# Patient Record
Sex: FEMALE | Race: BLACK OR AFRICAN AMERICAN | Employment: FULL TIME | URBAN - METROPOLITAN AREA
[De-identification: names, ages, dates, MRNs, and addresses within clinical notes are randomized per-mention and may not be internally consistent; named-entity substitution may affect disease eponyms.]

---

## 2020-11-11 ENCOUNTER — VIRTUAL VISIT (OUTPATIENT)
Dept: FAMILY MEDICINE CLINIC | Age: 25
End: 2020-11-11
Payer: MEDICAID

## 2020-11-11 DIAGNOSIS — Z13.29 SCREENING FOR ENDOCRINE, METABOLIC AND IMMUNITY DISORDER: ICD-10-CM

## 2020-11-11 DIAGNOSIS — R43.9 DISTURBANCE OF SMELL AND TASTE: Primary | ICD-10-CM

## 2020-11-11 DIAGNOSIS — Z13.0 SCREENING FOR ENDOCRINE, METABOLIC AND IMMUNITY DISORDER: ICD-10-CM

## 2020-11-11 DIAGNOSIS — Z13.228 SCREENING FOR ENDOCRINE, METABOLIC AND IMMUNITY DISORDER: ICD-10-CM

## 2020-11-11 PROCEDURE — 99212 OFFICE O/P EST SF 10 MIN: CPT | Performed by: NURSE PRACTITIONER

## 2020-11-11 NOTE — PROGRESS NOTES
Sedrick Linn is a 31 y.o. female who was seen by synchronous (real-time) audio-video technology on 11/11/2020 for Other (smell issues had Covid 7-4-2020/negative testing negative now)        Assessment & Plan:   Diagnoses and all orders for this visit:    1. Disturbance of smell and taste  -     REFERRAL TO ENT-OTOLARYNGOLOGY        I spent at least 10 minutes on this visit with this new patient. 712  Subjective:     Loss of taste/Smell    Patient previously had COVID in July but have tested negative. Patient states when having COVID she had a lost of taste and smell but in August her taste and smell came back. Then in September start having a garlic taste in her mouth and nose. She states due to the heavy garlic smell in her nose she now feels like her whole body smell like garlic. Patient is not sure if this is a residual from [place] or note but would like a further workup. I will refer patient to ENT and also order routine lab work. Denies numbness, tingling in the mouth. Denies SOB or problems with swallowing. Patient deny any chronic issues today or concern. Prior to Admission medications    Medication Sig Start Date End Date Taking? Authorizing Provider   methocarbamol (ROBAXIN) 500 mg tablet Take 2 Tabs by mouth three (3) times daily. 1/26/20   Sorin Jackson PA-C     There is no problem list on file for this patient. There are no active problems to display for this patient. Current Outpatient Medications   Medication Sig Dispense Refill    methocarbamol (ROBAXIN) 500 mg tablet Take 2 Tabs by mouth three (3) times daily. 24 Tab 0     No Known Allergies  Past Medical History:   Diagnosis Date    COVID-19      Past Surgical History:   Procedure Laterality Date    HX ORTHOPAEDIC       History reviewed. No pertinent family history.   Social History     Tobacco Use    Smoking status: Never Smoker    Smokeless tobacco: Never Used   Substance Use Topics    Alcohol use: Yes     Comment: occasionally       Review of Systems   Constitutional: Negative for chills, fever and malaise/fatigue. HENT: Negative for congestion and sore throat. Smell garlic all day and mouth taste like garlic constantly   Respiratory: Negative for cough, shortness of breath, wheezing and stridor. Cardiovascular: Negative for chest pain and palpitations. Musculoskeletal: Negative. Skin: Negative. Objective:     Patient-Reported Vitals 11/11/2020   Patient-Reported Weight 172lbs   Patient-Reported LMP 10-      General: alert, cooperative, no distress   Mental  status: normal mood, behavior, speech, dress, motor activity, and thought processes, able to follow commands   HENT: NCAT   Neck: no visualized mass   Resp: no respiratory distress   Neuro: no gross deficits   Skin: no discoloration or lesions of concern on visible areas   Psychiatric: normal affect, consistent with stated mood, no evidence of hallucinations     Additional exam findings: We discussed the expected course, resolution and complications of the diagnosis(es) in detail. Medication risks, benefits, costs, interactions, and alternatives were discussed as indicated. I advised her to contact the office if her condition worsens, changes or fails to improve as anticipated. She expressed understanding with the diagnosis(es) and plan. Kaye Larson, who was evaluated through a patient-initiated, synchronous (real-time) audio-video encounter, and/or her healthcare decision maker, is aware that it is a billable service, with coverage as determined by her insurance carrier. She provided verbal consent to proceed: Yes, and patient identification was verified. It was conducted pursuant to the emergency declaration under the [address] [place] [address], [address] [place] [address] and the Performable and Contactual[employer] General Act. A caregiver was present when appropriate.  Ability to conduct physical exam was limited. I was in the office. The patient was at home.       Nita Truong NP

## 2020-11-27 NOTE — PROGRESS NOTES
Patient to go to [place] or [place] to have labs done no later than 1 week before scheduled appt on 12-16

## 2020-11-30 ENCOUNTER — HOSPITAL ENCOUNTER (OUTPATIENT)
Dept: LAB | Age: 25
Discharge: HOME | End: 2020-11-30

## 2020-11-30 LAB — SENTARA SPECIMEN COL,SENBCF: NORMAL

## 2020-11-30 PROCEDURE — 99001 SPECIMEN HANDLING PT-LAB: CPT

## 2020-12-16 ENCOUNTER — OFFICE VISIT (OUTPATIENT)
Dept: FAMILY MEDICINE CLINIC | Age: 25
End: 2020-12-16
Payer: MEDICAID

## 2020-12-16 VITALS
DIASTOLIC BLOOD PRESSURE: 81 MMHG | TEMPERATURE: 97.5 F | HEIGHT: 63 IN | OXYGEN SATURATION: 99 % | WEIGHT: 176 LBS | HEART RATE: 88 BPM | SYSTOLIC BLOOD PRESSURE: 127 MMHG | BODY MASS INDEX: 31.18 KG/M2 | RESPIRATION RATE: 24 BRPM

## 2020-12-16 DIAGNOSIS — Z01.89 ENCOUNTER FOR LABORATORY EXAMINATION: ICD-10-CM

## 2020-12-16 DIAGNOSIS — N89.8 VAGINAL ODOR: ICD-10-CM

## 2020-12-16 DIAGNOSIS — E55.9 VITAMIN D DEFICIENCY: ICD-10-CM

## 2020-12-16 LAB
BILIRUB UR QL STRIP: NEGATIVE
GLUCOSE UR-MCNC: NEGATIVE MG/DL
KETONES P FAST UR STRIP-MCNC: NEGATIVE MG/DL
PH UR STRIP: 7 [PH] (ref 4.6–8)
PROT UR QL STRIP: NEGATIVE
SP GR UR STRIP: 1.02 (ref 1–1.03)
UA UROBILINOGEN AMB POC: NORMAL (ref 0.2–1)
URINALYSIS CLARITY POC: NORMAL
URINALYSIS COLOR POC: NORMAL
URINE BLOOD POC: NORMAL
URINE LEUKOCYTES POC: NEGATIVE
URINE NITRITES POC: NEGATIVE

## 2020-12-16 PROCEDURE — 81003 URINALYSIS AUTO W/O SCOPE: CPT | Performed by: NURSE PRACTITIONER

## 2020-12-16 PROCEDURE — 99213 OFFICE O/P EST LOW 20 MIN: CPT | Performed by: NURSE PRACTITIONER

## 2020-12-16 RX ORDER — ASPIRIN 325 MG
50000 TABLET, DELAYED RELEASE (ENTERIC COATED) ORAL
Qty: 12 CAP | Refills: 0 | Status: SHIPPED | OUTPATIENT
Start: 2020-12-16 | End: 2021-03-04

## 2020-12-16 NOTE — PROGRESS NOTES
Travis Lawler presents today for   Chief Complaint   Patient presents with   [address]    Complete Physical       Is someone accompanying this pt? no    Is the patient using any DME equipment during OV? no    Depression Screening:  3 most recent PHQ Screens 12/16/2020   Little interest or pleasure in doing things Not at all   Feeling down, depressed, irritable, or hopeless Not at all   Total Score PHQ 2 0   Trouble falling or staying asleep, or sleeping too much Not at all   Feeling tired or having little energy Not at all   Poor appetite, weight loss, or overeating Not at all   Feeling bad about yourself - or that you are a failure or have let yourself or your family down Not at all   Trouble concentrating on things such as school, work, reading, or watching TV Not at all   Moving or speaking so slowly that other people could have noticed; or the opposite being so fidgety that others notice Not at all   Thoughts of being better off dead, or hurting yourself in some way Not at all   PHQ 9 Score 0   How difficult have these problems made it for you to do your work, take care of your home and get along with others Not difficult at all       Learning Assessment:  No flowsheet data found. Abuse Screening:  No flowsheet data found. Fall Risk  No flowsheet data found. Health Maintenance reviewed and discussed and ordered per Provider. Health Maintenance Due   Topic Date Due    HPV Age 9Y-34Y (1 - 2-dose series) 08/19/2005    DTaP/Tdap/Td series (1 - Tdap) 08/19/2015    PAP AKA CERVICAL CYTOLOGY  08/19/2015    Flu Vaccine (1) 09/01/2020   . Coordination of Care:  1. Have you been to the ER, urgent care clinic since your last visit? Hospitalized since your last visit? no    2. Have you seen or consulted any other health care providers outside of the [address] [place] [address] since your last visit? Include any pap smears or colon screening.  no

## 2020-12-16 NOTE — PROGRESS NOTES
OFFICE NOTE    Kisha Martinez is a 31 y.o. female presenting today for the following:  Chief Complaint   Patient presents with    [facility] [place] [facility]    Complete Physical      HPI     Loss of taste/Smell  On previous visit patient was referred to ENT for loss of taste and smell and when it returned she started having a constant garlic taste in her mouth and nose. She went to ENT she was placed on a prednisone taper (patient have not started taking as of yet), singular and she states she is also being started on another nasal spray but can't remember name at this time. Patient state she is also getting a head CT of the head on January 30 th. Vaginal odor  Patient states she is still smelling garlic and not sure if its in her head or vaginal area have garlic smell. She denies vaginal discharge but would like a urinalysis and check for STD today. She states this all started since having COVID previously. Patient refuse pap exam today. Labs reviewed today. Patient is Vitamin D def. Educated patient on deficiency. Review of Systems   Constitutional: Negative for chills, fatigue and fever. HENT: Negative. Eyes: Negative. Respiratory: Negative for cough, shortness of breath and wheezing. Cardiovascular: Negative for chest pain and leg swelling. Gastrointestinal: Negative. Musculoskeletal: Negative. Neurological: Negative for dizziness, weakness, light-headedness, numbness and headaches.          History  Past Medical History:   Diagnosis Date    COVID-19        Past Surgical History:   Procedure Laterality Date    HX ORTHOPAEDIC         Social History     Socioeconomic History    Marital status: SINGLE     Spouse name: Not on file    Number of children: Not on file    Years of education: Not on file    Highest education level: Not on file   Occupational History    Not on file   Social Needs    Financial resource strain: Not on file    Food insecurity     Worry: Not on file Inability: Not on file    Transportation needs     Medical: Not on file     Non-medical: Not on file   Tobacco Use    Smoking status: Never Smoker    Smokeless tobacco: Never Used   Substance and Sexual Activity    Alcohol use: Yes     Comment: occasionally    Drug use: Never    Sexual activity: Not on file   Lifestyle    Physical activity     Days per week: Not on file     Minutes per session: Not on file    Stress: Not on file   Relationships    Social connections     Talks on phone: Not on file     Gets together: Not on file     Attends [employer] service: Not on file     Active member of club or organization: Not on file     Attends meetings of clubs or organizations: Not on file     Relationship status: Not on file    Intimate partner violence     Fear of current or ex partner: Not on file     Emotionally abused: Not on file     Physically abused: Not on file     Forced sexual activity: Not on file   Other Topics Concern    Not on file   Social History Narrative    Not on file       No Known Allergies    Current Outpatient Medications   Medication Sig Dispense Refill    cholecalciferol (VITAMIN D3) (50,000 UNITS /1250 MCG) capsule Take 1 Cap by mouth every seven (7) days for 12 doses. Then get an OTC version of 1,000-2,000 units to take daily. 12 Cap 0    methocarbamol (ROBAXIN) 500 mg tablet Take 2 Tabs by mouth three (3) times daily.  24 Tab 0         Patient Care Team:  Patient Care Team:  Barrett Alvarez NP as PCP - General (Nurse Practitioner)  Barrett Alvarez NP as PCP - Lian Lancaster Provider      LABS:  Results for orders placed or performed in visit on 12/16/20   AMB POC URINALYSIS DIP STICK AUTO W/O MICRO   Result Value Ref Range    Color (UA POC) Light Yellow     Clarity (UA POC) Slightly Cloudy     Glucose (UA POC) Negative Negative    Bilirubin (UA POC) Negative Negative    Ketones (UA POC) Negative Negative    Specific gravity (UA POC) 1.020 1.001 - 1.035    Blood (UA POC) 1+ Negative    pH (UA POC) 7.0 4.6 - 8.0    Protein (UA POC) Negative Negative    Urobilinogen (UA POC) 0.2 mg/dL 0.2 - 1    Nitrites (UA POC) Negative Negative    Leukocyte esterase (UA POC) Negative Negative        RADIOLOGY:  No recent results      Physical Exam  Constitutional:       Appearance: She is well-developed. Neck:      Musculoskeletal: Normal range of motion. Cardiovascular:      Rate and Rhythm: Normal rate and regular rhythm. Heart sounds: Normal heart sounds. Pulmonary:      Effort: Pulmonary effort is normal.      Breath sounds: Normal breath sounds. Musculoskeletal: Normal range of motion. Lymphadenopathy:      Cervical: No cervical adenopathy. Skin:     General: Skin is warm and dry. Neurological:      Mental Status: She is alert and oriented to person, place, and time.            3 most recent PHQ Screens 12/16/2020   Little interest or pleasure in doing things Not at all   Feeling down, depressed, irritable, or hopeless Not at all   Total Score PHQ 2 0   Trouble falling or staying asleep, or sleeping too much Not at all   Feeling tired or having little energy Not at all   Poor appetite, weight loss, or overeating Not at all   Feeling bad about yourself - or that you are a failure or have let yourself or your family down Not at all   Trouble concentrating on things such as school, work, reading, or watching TV Not at all   Moving or speaking so slowly that other people could have noticed; or the opposite being so fidgety that others notice Not at all   Thoughts of being better off dead, or hurting yourself in some way Not at all   PHQ 9 Score 0   How difficult have these problems made it for you to do your work, take care of your home and get along with others Not difficult at all         Vitals:    12/16/20 1100   BP: 127/81   Pulse: 88   Resp: 24   Temp: 97.5 °F (36.4 °C)   TempSrc: Temporal   SpO2: 99%   Weight: 176 lb (79.8 kg)   Height: 5' 3\" (1.6 m)   PainSc:   0 - No pain   LMP: 11/26/2020         Assessment and Plan    1. Vaginal odor  - NUSWAB VAGINITIS PLUS; Future  - NUSWAB VAGINITIS PLUS    2. Vitamin D deficiency  - cholecalciferol (VITAMIN D3) (50,000 UNITS /1250 MCG) capsule; Take 1 Cap by mouth every seven (7) days for 12 doses. Then get an OTC version of 1,000-2,000 units to take daily. Dispense: 12 Cap; Refill: 0  - VITAMIN D, 25 HYDROXY; Future    3. Encounter for laboratory examination  - AMB POC URINALYSIS DIP STICK AUTO W/O MICRO      MDM    Procedures      *Plan of care reviewed with patient. Patient in agreement with plan and expresses understanding. All questions answered and patient encouraged to call or RTO if further questions or concerns. Advised patient if symptoms worsen to go to nearest ER or call 911. AVS and recommendations given to patient upon discharge.

## 2020-12-16 NOTE — PATIENT INSTRUCTIONS
Vitamin B12 Deficiency: Care Instructions Overview A vitamin B12 deficiency means that your body doesn't have enough of this vitamin. You need vitamin B12 to keep red blood cells and nerve cells healthy. Not enough B12 can cause anemia. It can also damage nerves and cause trouble with memory and thinking. Many things can cause low levels of vitamin B12. They include: · Not getting enough of this vitamin through food. · An autoimmune problem, like pernicious anemia. · Weight-loss surgery, like gastric bypass. · Long-term use of heartburn medicines. Low levels of B12 may not cause symptoms. But symptoms may include fatigue, depression, and thinking or memory problems. You may have tingling in your hands or feet and changes in the way you walk. Treatment depends on the reason for low vitamin B12. Eating more foods rich in B12 may be enough. Or you might take the vitamin as a pill, as shots, or as nasal spray. How can you care for yourself? · Take vitamin B12 as your doctor recommends. · Go to your appointments if you are getting B12 shots. · Eat more foods rich in vitamin B12. Examples are: ? Animal products. These include meat, seafood, milk products, poultry, and eggs. ? Foods that have B12 added. These are called fortified foods. They include soy products, nutritional yeast, and dry cereals. · Work with a nutritionist or dietitian if you need help getting more vitamin B12 from food. · Talk to your doctor about stopping medicines if they are adding to your B12 deficiency. When should you call for help? Call 911 anytime you think you may need emergency care. For example, call if: 
  · You passed out (lost consciousness). Call your doctor now or seek immediate medical care if: 
  · You are dizzy or lightheaded, or you feel like you may faint. Watch closely for changes in your health. Be sure to call your doctor if: 
  · You are confused or can't think clearly.   · You don't get better as expected. Where can you learn more? Go to http://www.gray.com/ Enter ([phone] 0013-494 in the search box to learn more about \"Vitamin B12 Deficiency: Care Instructions. \" Current as of: November 8, 2019               Content Version: 12.6 © 0478-9412 [employer]. Care instructions adapted under license by [employer] (which disclaims liability or warranty for this information). If you have questions about a medical condition or this instruction, always ask your healthcare professional. [address] any warranty or liability for your use of this information.

## 2021-04-21 ENCOUNTER — HOSPITAL ENCOUNTER (EMERGENCY)
Age: 26
Discharge: HOME | End: 2021-04-21
Attending: EMERGENCY MEDICINE
Payer: COMMERCIAL

## 2021-04-21 ENCOUNTER — APPOINTMENT (OUTPATIENT)
Dept: GENERAL RADIOLOGY | Age: 26
End: 2021-04-21
Attending: EMERGENCY MEDICINE
Payer: COMMERCIAL

## 2021-04-21 VITALS
OXYGEN SATURATION: 100 % | WEIGHT: 178 LBS | HEART RATE: 74 BPM | DIASTOLIC BLOOD PRESSURE: 99 MMHG | RESPIRATION RATE: 20 BRPM | TEMPERATURE: 98.1 F | SYSTOLIC BLOOD PRESSURE: 140 MMHG | BODY MASS INDEX: 31.54 KG/M2 | HEIGHT: 63 IN

## 2021-04-21 DIAGNOSIS — K21.9 GASTROESOPHAGEAL REFLUX DISEASE WITHOUT ESOPHAGITIS: Primary | ICD-10-CM

## 2021-04-21 DIAGNOSIS — Z82.49 FAMILY HISTORY OF HIGH BLOOD PRESSURE: ICD-10-CM

## 2021-04-21 DIAGNOSIS — R03.0 ELEVATED BLOOD PRESSURE READING: ICD-10-CM

## 2021-04-21 DIAGNOSIS — R09.89 GLOBUS SENSATION: ICD-10-CM

## 2021-04-21 LAB
ALBUMIN SERPL-MCNC: 4.3 G/DL (ref 3.4–5)
ALBUMIN/GLOB SERPL: 1 {RATIO} (ref 0.8–1.7)
ALP SERPL-CCNC: 100 U/L (ref 45–117)
ALT SERPL-CCNC: 31 U/L (ref 13–56)
ANION GAP SERPL CALC-SCNC: 7 MMOL/L (ref 3–18)
AST SERPL-CCNC: 18 U/L (ref 10–38)
BASOPHILS # BLD: 0 K/UL (ref 0–0.1)
BASOPHILS NFR BLD: 1 % (ref 0–2)
BILIRUB SERPL-MCNC: 1.2 MG/DL (ref 0.2–1)
BUN SERPL-MCNC: 9 MG/DL (ref 7–18)
BUN/CREAT SERPL: 9 (ref 12–20)
CALCIUM SERPL-MCNC: 9.1 MG/DL (ref 8.5–10.1)
CHLORIDE SERPL-SCNC: 105 MMOL/L (ref 100–111)
CK MB CFR SERPL CALC: NORMAL % (ref 0–4)
CK MB SERPL-MCNC: <1 NG/ML (ref 5–25)
CK SERPL-CCNC: 100 U/L (ref 26–192)
CO2 SERPL-SCNC: 29 MMOL/L (ref 21–32)
CREAT SERPL-MCNC: 1.02 MG/DL (ref 0.6–1.3)
DIFFERENTIAL METHOD BLD: ABNORMAL
EOSINOPHIL # BLD: 0.1 K/UL (ref 0–0.4)
EOSINOPHIL NFR BLD: 2 % (ref 0–5)
ERYTHROCYTE [DISTWIDTH] IN BLOOD BY AUTOMATED COUNT: 11.5 % (ref 11.6–14.5)
GLOBULIN SER CALC-MCNC: 4.4 G/DL (ref 2–4)
GLUCOSE SERPL-MCNC: 73 MG/DL (ref 74–99)
HCT VFR BLD AUTO: 36.1 % (ref 35–45)
HGB BLD-MCNC: 12.5 G/DL (ref 12–16)
LYMPHOCYTES # BLD: 2.6 K/UL (ref 0.9–3.6)
LYMPHOCYTES NFR BLD: 44 % (ref 21–52)
MCH RBC QN AUTO: 31.4 PG (ref 24–34)
MCHC RBC AUTO-ENTMCNC: 34.6 G/DL (ref 31–37)
MCV RBC AUTO: 90.7 FL (ref 74–97)
MONOCYTES # BLD: 0.6 K/UL (ref 0.05–1.2)
MONOCYTES NFR BLD: 10 % (ref 3–10)
NEUTS SEG # BLD: 2.6 K/UL (ref 1.8–8)
NEUTS SEG NFR BLD: 44 % (ref 40–73)
PLATELET # BLD AUTO: 357 K/UL (ref 135–420)
PMV BLD AUTO: 9.4 FL (ref 9.2–11.8)
POTASSIUM SERPL-SCNC: 3.3 MMOL/L (ref 3.5–5.5)
PROT SERPL-MCNC: 8.7 G/DL (ref 6.4–8.2)
RBC # BLD AUTO: 3.98 M/UL (ref 4.2–5.3)
SODIUM SERPL-SCNC: 141 MMOL/L (ref 136–145)
TROPONIN I SERPL-MCNC: <0.02 NG/ML (ref 0–0.04)
WBC # BLD AUTO: 5.9 K/UL (ref 4.6–13.2)

## 2021-04-21 PROCEDURE — 93005 ELECTROCARDIOGRAM TRACING: CPT

## 2021-04-21 PROCEDURE — 80053 COMPREHEN METABOLIC PANEL: CPT

## 2021-04-21 PROCEDURE — 82553 CREATINE MB FRACTION: CPT

## 2021-04-21 PROCEDURE — 71045 X-RAY EXAM CHEST 1 VIEW: CPT

## 2021-04-21 PROCEDURE — 99284 EMERGENCY DEPT VISIT MOD MDM: CPT

## 2021-04-21 PROCEDURE — 74011250637 HC RX REV CODE- 250/637: Performed by: STUDENT IN AN ORGANIZED HEALTH CARE EDUCATION/TRAINING PROGRAM

## 2021-04-21 PROCEDURE — 85025 COMPLETE CBC W/AUTO DIFF WBC: CPT

## 2021-04-21 RX ORDER — ACETAMINOPHEN 500 MG
500 TABLET ORAL
Status: DISCONTINUED | OUTPATIENT
Start: 2021-04-21 | End: 2021-04-22 | Stop reason: HOSPADM

## 2021-04-21 RX ORDER — PANTOPRAZOLE SODIUM 40 MG/1
40 TABLET, DELAYED RELEASE ORAL DAILY
Qty: 30 TAB | Refills: 0 | Status: SHIPPED | OUTPATIENT
Start: 2021-04-21 | End: 2021-08-27 | Stop reason: ALTCHOICE

## 2021-04-21 RX ORDER — MINERAL OIL
ENEMA (ML) RECTAL
COMMUNITY
End: 2021-08-27

## 2021-04-21 RX ORDER — OMEPRAZOLE 20 MG/1
20 CAPSULE, DELAYED RELEASE ORAL DAILY
COMMUNITY
End: 2021-04-21

## 2021-04-21 RX ORDER — CLINDAMYCIN HYDROCHLORIDE 300 MG/1
300 CAPSULE ORAL 3 TIMES DAILY
COMMUNITY
End: 2021-05-28 | Stop reason: ALTCHOICE

## 2021-04-21 RX ADMIN — ACETAMINOPHEN 500 MG: 500 TABLET, FILM COATED ORAL at 21:45

## 2021-04-21 RX ADMIN — ALUMINUM HYDROXIDE AND MAGNESIUM HYDROXIDE 15 ML: 200; 200 SUSPENSION ORAL at 21:46

## 2021-04-21 NOTE — LETTER
[address] [place] [address] [facility] 
[address] [facility] [id number] [phone] Work/School Note Date: 4/21/2021 To Whom It May concern: 
 
Ángela Mattson was seen and treated today in the emergency room by the following provider(s): 
Attending Provider: Emy Zuñiga MD 
Resident: Hank Mantilla MD. Ángela Mattson may return to work on 04/24/2021.  
 
Sincerely, 
 
 
 
 
Grant Herrera RN

## 2021-04-21 NOTE — ED TRIAGE NOTES
Pt has been seen recently at [facility] for a funny feeling in the back of her throat. Has appt with her doctor Friday and ENT appt next Thursday.

## 2021-04-22 ENCOUNTER — HOSPITAL ENCOUNTER (EMERGENCY)
Age: 26
Discharge: HOME | End: 2021-04-22
Attending: EMERGENCY MEDICINE
Payer: COMMERCIAL

## 2021-04-22 ENCOUNTER — TELEPHONE (OUTPATIENT)
Dept: FAMILY MEDICINE CLINIC | Age: 26
End: 2021-04-22

## 2021-04-22 VITALS
HEART RATE: 85 BPM | RESPIRATION RATE: 22 BRPM | DIASTOLIC BLOOD PRESSURE: 86 MMHG | WEIGHT: 178 LBS | OXYGEN SATURATION: 100 % | TEMPERATURE: 98.3 F | BODY MASS INDEX: 31.54 KG/M2 | SYSTOLIC BLOOD PRESSURE: 126 MMHG | HEIGHT: 63 IN

## 2021-04-22 DIAGNOSIS — R10.13 ABDOMINAL PAIN, EPIGASTRIC: Primary | ICD-10-CM

## 2021-04-22 DIAGNOSIS — R03.0 ELEVATED BLOOD PRESSURE READING: ICD-10-CM

## 2021-04-22 LAB
ATRIAL RATE: 73 BPM
CALCULATED P AXIS, ECG09: 37 DEGREES
CALCULATED R AXIS, ECG10: -19 DEGREES
CALCULATED T AXIS, ECG11: 24 DEGREES
DIAGNOSIS, 93000: NORMAL
P-R INTERVAL, ECG05: 142 MS
Q-T INTERVAL, ECG07: 388 MS
QRS DURATION, ECG06: 80 MS
QTC CALCULATION (BEZET), ECG08: 427 MS
VENTRICULAR RATE, ECG03: 73 BPM

## 2021-04-22 PROCEDURE — 74011250637 HC RX REV CODE- 250/637: Performed by: EMERGENCY MEDICINE

## 2021-04-22 PROCEDURE — 99283 EMERGENCY DEPT VISIT LOW MDM: CPT

## 2021-04-22 PROCEDURE — 99284 EMERGENCY DEPT VISIT MOD MDM: CPT

## 2021-04-22 PROCEDURE — 74011000250 HC RX REV CODE- 250: Performed by: EMERGENCY MEDICINE

## 2021-04-22 RX ORDER — LIDOCAINE HYDROCHLORIDE 20 MG/ML
15 SOLUTION OROPHARYNGEAL AS NEEDED
Status: DISCONTINUED | OUTPATIENT
Start: 2021-04-22 | End: 2021-04-22 | Stop reason: HOSPADM

## 2021-04-22 RX ADMIN — LIDOCAINE HYDROCHLORIDE 15 ML: 20 SOLUTION ORAL; TOPICAL at 09:57

## 2021-04-22 RX ADMIN — ALUMINUM HYDROXIDE AND MAGNESIUM HYDROXIDE 15 ML: 200; 200 SUSPENSION ORAL at 09:57

## 2021-04-22 NOTE — ED PROVIDER NOTES
HPI patient complains of an epigastric \"burning pain\" with radiation up the middle of her chest.  She also complains of some mild nausea. She was seen here in this department about 12 hours ago for the same symptoms. Patient says she went home and was feeling better but when she woke up this morning the epigastric burning sensation was back. She says symptoms are the same as they were last night when she was seen here. She says she went to work and told work about her symptoms and they took her blood pressure and found that it was elevated. At this point her work supervisor told her to come the emergency room for an evaluation. She denies any shortness of breath. She denies any nausea or vomiting. Patient says she has a great deal of anxiety about the symptoms and she carries a lot of anxiety in general.  No further specifics given at this time. Past Medical History:   Diagnosis Date    COVID-19        Past Surgical History:   Procedure Laterality Date    HX ORTHOPAEDIC           History reviewed. No pertinent family history.     Social History     Socioeconomic History    Marital status: SINGLE     Spouse name: Not on file    Number of children: Not on file    Years of education: Not on file    Highest education level: Not on file   Occupational History    Not on file   Social Needs    Financial resource strain: Not on file    Food insecurity     Worry: Not on file     Inability: Not on file    Transportation needs     Medical: Not on file     Non-medical: Not on file   Tobacco Use    Smoking status: Never Smoker    Smokeless tobacco: Never Used   Substance and Sexual Activity    Alcohol use: Yes     Comment: occasionally    Drug use: Never    Sexual activity: Not on file   Lifestyle    Physical activity     Days per week: Not on file     Minutes per session: Not on file    Stress: Not on file   Relationships    Social connections     Talks on phone: Not on file     Gets together: Not on file     Attends [employer] service: Not on file     Active member of club or organization: Not on file     Attends meetings of clubs or organizations: Not on file     Relationship status: Not on file    Intimate partner violence     Fear of current or ex partner: Not on file     Emotionally abused: Not on file     Physically abused: Not on file     Forced sexual activity: Not on file   Other Topics Concern    Not on file   Social History Narrative    Not on file         ALLERGIES: Patient has no known allergies. Review of Systems   HENT: Negative. Eyes: Negative. Respiratory: Negative. Cardiovascular: Positive for chest pain. Gastrointestinal: Negative. Endocrine: Negative. Genitourinary: Negative. Musculoskeletal: Negative. Neurological: Negative. Psychiatric/Behavioral: Negative. Vitals:    04/22/21 0923   BP: (!) 171/109   Pulse: 74   Resp: 20   Temp: 98.3 °F (36.8 °C)   SpO2: 100%   Weight: 80.7 kg (178 lb)   Height: 5' 3\" (1.6 m)            Physical Exam  Vitals signs and nursing note reviewed. Constitutional:       Appearance: She is well-developed. HENT:      Head: Normocephalic and atraumatic. Nose: Nose normal.      Mouth/Throat:      Mouth: Mucous membranes are moist.   Eyes:      Conjunctiva/sclera: Conjunctivae normal.      Pupils: Pupils are equal, round, and reactive to light. Neck:      Musculoskeletal: Normal range of motion and neck supple. Cardiovascular:      Rate and Rhythm: Normal rate and regular rhythm. Pulmonary:      Effort: Pulmonary effort is normal.      Breath sounds: Normal breath sounds. Abdominal:      Palpations: Abdomen is soft. Musculoskeletal: Normal range of motion. Skin:     General: Skin is warm and dry. Neurological:      Mental Status: She is alert and oriented to person, place, and time.    Psychiatric:         Mood and Affect: Mood normal.          MDM  Number of Diagnoses or Management Options  Diagnosis management comments: I reviewed the patient's lab results from 12 hours ago and discussed them with the patient. Given her clinical picture and her reassuring labs, there is no indication for repeating labs at this point. I discussed with her that the most prudent approach is to get her a referral to gastroenterology for further evaluation. Patient understands and agrees with this plan.   Ana Butcher MD 10:25 AM         Procedures

## 2021-04-22 NOTE — Clinical Note
[address] [place] [address] [facility] 
[address] [facility] [address] [phone] Work/School Note Date: 4/22/2021 To Whom It May concern: 
 
 
Darling Henderson was seen and treated today in the emergency room by the following provider(s): 
Attending Provider: Fabien Clemens MD. Darling Henderson is excused from work/school on 04/22/21. She is clear to return to work/school on 04/23/21.    
 
 
Sincerely, 
 
 
 
 
Brenna Burnham MD

## 2021-04-22 NOTE — ED TRIAGE NOTES
Pt was seen here last night for funny feeling her throat. Was dx'd with GERD. Was at work this morning and her BP was elevated. Also having epigastric pain. Pt appears anxious.  Has appt with her doctor tomorrow

## 2021-04-22 NOTE — DISCHARGE INSTRUCTIONS
Please get a home blood pressure monitor and take your blood pressure every morning and evening, taking a record of each reading, and bring them to your PCP. Your blood pressure was elevated in the ED and if it is still elevated at home, it may indicate the need for further work up or treatment. Please take the new medication we have prescribed for GERD. Please discuss following up with a gastroenterologist doctor with your PCP at your next appointment.

## 2021-04-22 NOTE — ED PROVIDER NOTES
EMERGENCY DEPARTMENT HISTORY AND PHYSICAL EXAM    8:07 PM    Date: 4/21/2021  Patient Name: Cristy Robledo    History of Presenting Illness     Chief Complaint   Patient presents with    Sore Throat       History Provided By: Patient  Location/Duration/Severity/Modifying factors   Patient is a 79-year-old female with past medical history significant for GERD and COVID-19 infections 9 months ago who presents for care today due to a feeling of substernal chest pain that radiates to her back along with a sensation of tightening of her throat and difficulty swallowing. She reports that she woke up Thursday morning with a slightly sore throat and feeling of difficulty swallowing. She went to urgent care where she tested negative for strep and Covid and was told she had \"a canker sore in her throat. \"  On Saturday she continued to have throat discomfort chest pain and sensation of something caught in her throat making it difficult to swallow, she presented to [facility] ED, where she was prescribed amoxicillin for presumed bacterial pharyngitis. She states she initiated the medication but stopped taking it because she was concerned about side effects. Additionally she saw her dentist on Monday who prescribed clindamycin and scheduled her for a an extraction of her lower right molar next Tuesday. She endorses subjective shortness of breath due to the feeling of chest tightness and substernal pressure. States that today she felt like the sensation of chest tightness and something in her throat was more noticeable, prompting her to come in. States she has tried over-the-counter antacids without any relief of symptoms.   Denies fever, chills, runny nose, postnasal drip, cough, stomach pain, nausea, vomiting, diarrhea, constipation, or body aches.              PCP: Jenise Santiago NP    Current Facility-Administered Medications   Medication Dose Route Frequency Provider Last Rate Last Admin    acetaminophen (TYLENOL) tablet 500 mg  500 mg Oral Q4H PRN Bony Samano MD   500 mg at 04/21/21 5584     Current Outpatient Medications   Medication Sig Dispense Refill    clindamycin (CLEOCIN) 300 mg capsule Take 300 mg by mouth three (3) times daily.  fexofenadine (ALLEGRA) 180 mg tablet Take  by mouth.  pantoprazole (Protonix) 40 mg tablet Take 1 Tab by mouth daily. 30 Tab 0       Past History     Past Medical History:  Past Medical History:   Diagnosis Date    COVID-19        Past Surgical History:  Past Surgical History:   Procedure Laterality Date    HX ORTHOPAEDIC         Family History:  History reviewed. No pertinent family history. Social History:  Social History     Tobacco Use    Smoking status: Never Smoker    Smokeless tobacco: Never Used   Substance Use Topics    Alcohol use: Yes     Comment: occasionally    Drug use: Never       Allergies:  No Known Allergies      Review of Systems     Review of Systems   Constitutional: Negative. Negative for chills, diaphoresis, fatigue and fever. HENT: Positive for dental problem, sore throat and trouble swallowing. Negative for ear pain, facial swelling, hearing loss, mouth sores, postnasal drip, rhinorrhea, sinus pressure, sinus pain and sneezing. Pt reports a sensation of difficulty swallowing or a tightening in her lower throat. Reported pain earlier in onset but no pain now. Eyes: Negative. Physical Exam     Visit Vitals  BP (!) 140/99 (BP 1 Location: Left upper arm, BP Patient Position: At rest) Comment: MD Notified   Pulse 74   Temp 98.1 °F (36.7 °C)   Resp 20   Ht 5' 3\" (1.6 m)   Wt 80.7 kg (178 lb)   LMP 04/13/2021   SpO2 100%   BMI 31.53 kg/m²       Physical Exam  Vitals signs and nursing note reviewed. Constitutional:       Appearance: She is well-developed and normal weight. She is not ill-appearing or toxic-appearing. Comments: Appears mildly upset   HENT:      Head: Normocephalic and atraumatic.       Nose: No congestion or rhinorrhea. Mouth/Throat:      Mouth: Mucous membranes are moist. No oral lesions. Pharynx: Oropharynx is clear. No pharyngeal swelling, oropharyngeal exudate, posterior oropharyngeal erythema or uvula swelling. Tonsils: No tonsillar exudate or tonsillar abscesses. Eyes:      Conjunctiva/sclera: Conjunctivae normal.      Pupils: Pupils are equal, round, and reactive to light. Neck:      Musculoskeletal: Normal range of motion and neck supple. Cardiovascular:      Rate and Rhythm: Normal rate and regular rhythm. Heart sounds: Normal heart sounds. No murmur. No friction rub. No gallop. Pulmonary:      Effort: Pulmonary effort is normal. No respiratory distress. Breath sounds: Normal breath sounds. No wheezing, rhonchi or rales. Chest:      Chest wall: No tenderness. Abdominal:      General: There is no distension. Palpations: Abdomen is soft. Tenderness: There is no abdominal tenderness. Lymphadenopathy:      Cervical: No cervical adenopathy. Skin:     General: Skin is warm and dry. Capillary Refill: Capillary refill takes less than 2 seconds. Neurological:      General: No focal deficit present. Mental Status: She is alert and oriented to person, place, and time.    Psychiatric:         Mood and Affect: Mood normal.         Behavior: Behavior normal.         Diagnostic Study Results     Labs -  Recent Results (from the past 12 hour(s))   EKG, 12 LEAD, INITIAL    Collection Time: 04/21/21  9:02 PM   Result Value Ref Range    Ventricular Rate 73 BPM    Atrial Rate 73 BPM    P-R Interval 142 ms    QRS Duration 80 ms    Q-T Interval 388 ms    QTC Calculation (Bezet) 427 ms    Calculated P Axis 37 degrees    Calculated R Axis -19 degrees    Calculated T Axis 24 degrees    Diagnosis       Normal sinus rhythm with sinus arrhythmia  Septal infarct , age undetermined  Abnormal ECG  No previous ECGs available     CBC WITH AUTOMATED DIFF    Collection Time: 04/21/21  9:48 PM   Result Value Ref Range    WBC 5.9 4.6 - 13.2 K/uL    RBC 3.98 (L) 4.20 - 5.30 M/uL    HGB 12.5 12.0 - 16.0 g/dL    HCT 36.1 35.0 - 45.0 %    MCV 90.7 74.0 - 97.0 FL    MCH 31.4 24.0 - 34.0 PG    MCHC 34.6 31.0 - 37.0 g/dL    RDW 11.5 (L) 11.6 - 14.5 %    PLATELET 699 347 - 374 K/uL    MPV 9.4 9.2 - 11.8 FL    NEUTROPHILS 44 40 - 73 %    LYMPHOCYTES 44 21 - 52 %    MONOCYTES 10 3 - 10 %    EOSINOPHILS 2 0 - 5 %    BASOPHILS 1 0 - 2 %    ABS. NEUTROPHILS 2.6 1.8 - 8.0 K/UL    ABS. LYMPHOCYTES 2.6 0.9 - 3.6 K/UL    ABS. MONOCYTES 0.6 0.05 - 1.2 K/UL    ABS. EOSINOPHILS 0.1 0.0 - 0.4 K/UL    ABS. BASOPHILS 0.0 0.0 - 0.1 K/UL    DF AUTOMATED     METABOLIC PANEL, COMPREHENSIVE    Collection Time: 04/21/21  9:48 PM   Result Value Ref Range    Sodium 141 136 - 145 mmol/L    Potassium 3.3 (L) 3.5 - 5.5 mmol/L    Chloride 105 100 - 111 mmol/L    CO2 29 21 - 32 mmol/L    Anion gap 7 3.0 - 18 mmol/L    Glucose 73 (L) 74 - 99 mg/dL    BUN 9 7.0 - 18 MG/DL    Creatinine 1.02 0.6 - 1.3 MG/DL    BUN/Creatinine ratio 9 (L) 12 - 20      GFR est AA >60 >60 ml/min/1.73m2    GFR est non-AA >60 >60 ml/min/1.73m2    Calcium 9.1 8.5 - 10.1 MG/DL    Bilirubin, total 1.2 (H) 0.2 - 1.0 MG/DL    ALT (SGPT) 31 13 - 56 U/L    AST (SGOT) 18 10 - 38 U/L    Alk. phosphatase 100 45 - 117 U/L    Protein, total 8.7 (H) 6.4 - 8.2 g/dL    Albumin 4.3 3.4 - 5.0 g/dL    Globulin 4.4 (H) 2.0 - 4.0 g/dL    A-G Ratio 1.0 0.8 - 1.7     CARDIAC PANEL,(CK, CKMB & TROPONIN)    Collection Time: 04/21/21  9:48 PM   Result Value Ref Range    CK - MB <1.0 <3.6 ng/ml    CK-MB Index  0.0 - 4.0 %     CALCULATION NOT PERFORMED WHEN RESULT IS BELOW LINEAR LIMIT     26 - 192 U/L    Troponin-I, QT <0.02 0.0 - 0.045 NG/ML       Radiologic Studies -   XR CHEST PORT    (Results Pending)       Medical Decision Making   I am the first provider for this patient.     I reviewed the vital signs, available nursing notes, past medical history, past surgical history, family history and social history. Vital Signs-Reviewed the patient's vital signs. EKG: NSR with sinus arhythmia. Records Reviewed: Nursing Notes and Old Medical Records (Time of Review: 8:07 PM)    ED Course: Progress Notes, Reevaluation, and Consults:    Provider Notes (Medical Decision Making):   MDM  Number of Diagnoses or Management Options  Family history of high blood pressure  Gastroesophageal reflux disease without esophagitis  Globus sensation  High blood pressure determined by examination  Diagnosis management comments: Patient is a 25-year-old female with past medical history of GERD and COVID-19 infection approximately 9 months ago. Presents today with globus sensation in her throat along with a sensation of substernal chest pain pressure with radiation to her back. Cardiac panel WNL. EKG and chest x-ray appeared grossly normal on bedside read. CBC and CMP significant only for mildly low potassium at 3.3, which is unlikely to have clinical relevance. Of note patient had multiple elevated blood pressure readings around 140/100 mmHg. Patient reports she has been told she has had high blood pressure readings in the past and she has a significant family history of high blood pressure. Current presentation most likely represents an exacerbation of her known GERD and potentially somatic symptoms of anxiety. Encourage patient to follow-up closely with her PCP for treatment of both. Instructed patient to get a home blood pressure cuff, record readings twice a day for 1 week, and take a log to her PCP. Prescribed Protonix for outpatient and given a single dose of Maalox in the ED. Patient is safe for discharge home with PCP appointment already scheduled for Friday. Procedures- none        Diagnosis     Clinical Impression:   1. Gastroesophageal reflux disease without esophagitis    2. Globus sensation    3. Family history of high blood pressure    4.  Elevated blood pressure reading        Disposition: home with PCP follow up. Follow-up Information     Follow up With Specialties Details Why Contact Info    Svetlana Santiago NP Nurse Practitioner Call in 1 day  Soumya Colon [address] [phone] [address] [facility] EMERGENCY DEPT Emergency Medicine  If symptoms worsen [address]uniga MD Gastroenterology Schedule an appointment as soon as possible for a visit   [facility]  [phone]             Patient's Medications   Start Taking    PANTOPRAZOLE (PROTONIX) 40 MG TABLET    Take 1 Tab by mouth daily. Continue Taking    CLINDAMYCIN (CLEOCIN) 300 MG CAPSULE    Take 300 mg by mouth three (3) times daily. FEXOFENADINE (ALLEGRA) 180 MG TABLET    Take  by mouth. These Medications have changed    No medications on file   Stop Taking    METHOCARBAMOL (ROBAXIN) 500 MG TABLET    Take 2 Tabs by mouth three (3) times daily. OMEPRAZOLE (PRILOSEC) 20 MG CAPSULE    Take 20 mg by mouth daily.        Sherrie Terrazas MD, PGY-1   [facility]   April 21, 2021, 8:07 PM

## 2021-04-22 NOTE — LETTER
[facility] EMERGENCY DEPT 
[address] [id number] [phone] Work/School Note Date: 4/22/2021 To Whom It May concern: 
 
Ginger Stephens was seen and treated today in the emergency room by the following provider(s): 
Attending Provider: Trinity Diaz MD. Ginger Stephens may return to work on 4/23/2021.  
 
Sincerely, 
 
 
 
 
Roberto Carlos Cadena RN

## 2021-04-22 NOTE — TELEPHONE ENCOUNTER
Attempted to contact patient in ref to ER visit today-no answer unable to leave a message mailbox not set up.   Patient has follow up visit on 4-

## 2021-04-22 NOTE — ED NOTES
Patient up for discharge. Discharge results have been reviewed with patient by the Provider. Armband removed and shredded per policy. Encouraged to voice any concerns, and all concerns addressed. Patient discharged in stable condition with no apparent distress. Patient given meal prior to discharge. Current Discharge Medication List      START taking these medications    Details   pantoprazole (Protonix) 40 mg tablet Take 1 Tab by mouth daily.   Qty: 30 Tab, Refills: 0         STOP taking these medications       omeprazole (PRILOSEC) 20 mg capsule Comments:   Reason for Stopping:

## 2021-04-23 ENCOUNTER — VIRTUAL VISIT (OUTPATIENT)
Dept: FAMILY MEDICINE CLINIC | Age: 26
End: 2021-04-23
Payer: COMMERCIAL

## 2021-04-23 DIAGNOSIS — R03.0 ELEVATED BLOOD-PRESSURE READING WITHOUT DIAGNOSIS OF HYPERTENSION: ICD-10-CM

## 2021-04-23 DIAGNOSIS — K21.9 GASTROESOPHAGEAL REFLUX DISEASE WITHOUT ESOPHAGITIS: ICD-10-CM

## 2021-04-23 DIAGNOSIS — F41.9 ANXIETY: ICD-10-CM

## 2021-04-23 PROBLEM — N94.6 DYSMENORRHEA: Status: ACTIVE | Noted: 2018-04-05

## 2021-04-23 PROCEDURE — 99213 OFFICE O/P EST LOW 20 MIN: CPT | Performed by: NURSE PRACTITIONER

## 2021-04-23 RX ORDER — ESCITALOPRAM OXALATE 10 MG/1
10 TABLET ORAL DAILY
Qty: 30 TAB | Refills: 1 | Status: SHIPPED | OUTPATIENT
Start: 2021-04-23 | End: 2021-05-28

## 2021-04-23 NOTE — PROGRESS NOTES
Cooper Farrar presents today for   Chief Complaint   Patient presents with   [facility] ED Follow-up     Chest Tightness, HTN and GERD  4-       Is someone accompanying this pt? no    Is the patient using any DME equipment during [address]? no    Depression Screening:  3 most recent PHQ Screens 4/23/2021   Little interest or pleasure in doing things Not at all   Feeling down, depressed, irritable, or hopeless Not at all   Total Score PHQ 2 0   Trouble falling or staying asleep, or sleeping too much Not at all   Feeling tired or having little energy Not at all   Poor appetite, weight loss, or overeating Not at all   Feeling bad about yourself - or that you are a failure or have let yourself or your family down Not at all   Trouble concentrating on things such as school, work, reading, or watching TV Not at all   Moving or speaking so slowly that other people could have noticed; or the opposite being so fidgety that others notice Not at all   Thoughts of being better off dead, or hurting yourself in some way Not at all   PHQ 9 Score 0   How difficult have these problems made it for you to do your work, take care of your home and get along with others Not difficult at all       Learning Assessment:  No flowsheet data found. Abuse Screening:  No flowsheet data found. Fall Risk  No flowsheet data found. Health Maintenance reviewed and discussed and ordered per Provider. Health Maintenance Due   Topic Date Due    Hepatitis C Screening  Never done    HPV Age 9Y-34Y (1 - 2-dose series) Never done    COVID-19 Vaccine (1) Never done    DTaP/Tdap/Td series (1 - Tdap) Never done    PAP AKA CERVICAL CYTOLOGY  Never done   . Coordination of Care:  1. Have you been to the ER, urgent care clinic since your last visit? Hospitalized since your last visit? Yes  [facility] ED    2. Have you seen or consulted any other health care providers outside of the [address] [place] [address] since your last visit?  Include any pap smears or colon screening.  no      Health maintenance issues addressed patient is aware she is due for pap smear and will schedule

## 2021-04-23 NOTE — PROGRESS NOTES
Maggie Beaulieu is a 31 y.o. female who was seen by synchronous (real-time) audio-video technology on 4/23/2021 for ED Follow-up (Chest Tightness, HTN and GERD  4-)        Assessment & Plan:   Diagnoses and all orders for this visit:    1. Anxiety  -     escitalopram oxalate (LEXAPRO) 10 mg tablet; Take 1 Tab by mouth daily. Indications: anxiousness associated with depression    2. Gastroesophageal reflux disease without esophagitis    3. Elevated blood-pressure reading without diagnosis of hypertension        I spent at least 20 minutes on this visit with this established patient. 712  Subjective:     Elevated blood pressure/GERD Symptoms  Presented to the emergency room yesterday on April 22 for elevated blood pressure and epigastric pain. She was complaining of burning pain with radiation up the middle of her chest according to notes. It also states she has some mild nausea. She had been seen 12 hours prior in the same ED for the same symptoms. She had returned because she was not feeling any better. Labs were drawn and patient was informed to be referred to gastroenterology for further evaluation. Blood pressure in the emergency room was 171/109 upon discharge her blood pressure had dropped to 126/86. He was discharged same day. Advised patient to monitor blood pressure if it is consistently elevated to follow up in the office. Her elevated blood pressure is likely due to anxiety. She states she was taking Prilosec but it was not working. She is not taking Protonix and she is doing better. She denies side effects to the medication and taking as prescribed. She states she have an appointment with gastroenterologist [facility] gastroenterology) on Monday. Anxiety  She states anxiety runs in her family and she have a history of anxiety that never was treated. She was given atarax in the past that did not help. She states she is a over think things and worry about everything.   She states she don't shut down but she will get small headaches when she get anxious or stressed out. Little things that may be out of her control and she will start to become anxious. When she is in a anxious mode she over think everything and can't sleep. She states when she is around a whole lot of people she will sometimes get anxious. She states when anxious she have problems comprehending things she normally would. She have episodes of anxiety of at least 4 or 5 times a week. She states she makes other people issue her issue and it will cause her to become anxious. I will prescribe lexapro 10 mg daily. Will reasess in 5 to 6 weeks. Prior to Admission medications    Medication Sig Start Date End Date Taking? Authorizing Provider   clindamycin (CLEOCIN) 300 mg capsule Take 300 mg by mouth three (3) times daily. Yes Other, MD José   fexofenadine (ALLEGRA) 180 mg tablet Take  by mouth. Yes Other, MD José   pantoprazole (Protonix) 40 mg tablet Take 1 Tab by mouth daily. 4/21/21  Yes Hank Mantilla MD     Patient Active Problem List   Diagnosis Code    Dysmenorrhea N94.6     Current Outpatient Medications   Medication Sig Dispense Refill    escitalopram oxalate (LEXAPRO) 10 mg tablet Take 1 Tab by mouth daily. Indications: anxiousness associated with depression 30 Tab 1    clindamycin (CLEOCIN) 300 mg capsule Take 300 mg by mouth three (3) times daily.  fexofenadine (ALLEGRA) 180 mg tablet Take  by mouth.  pantoprazole (Protonix) 40 mg tablet Take 1 Tab by mouth daily. 27 Tab 0     No Known Allergies  Family History   Problem Relation Age of Onset    Hypertension Mother     Cancer Maternal Grandfather        Review of Systems   Constitutional: Negative for chills, fever and malaise/fatigue. Eyes: Negative. Respiratory: Negative for cough, shortness of breath and wheezing. Cardiovascular: Negative for chest pain, palpitations and leg swelling.    Musculoskeletal: Negative. Skin: Negative. Neurological: Negative. Objective:     Patient-Reported Vitals 4/23/2021   Patient-Reported Weight 178   Patient-Reported LMP 4-      General: alert, cooperative, no distress   Mental  status: normal mood, behavior, speech, dress, motor activity, and thought processes, able to follow commands   HENT: NCAT   Neck: no visualized mass   Resp: no respiratory distress   Neuro: no gross deficits   Skin: no discoloration or lesions of concern on visible areas   Psychiatric: normal affect, consistent with stated mood, no evidence of hallucinations     Additional exam findings: We discussed the expected course, resolution and complications of the diagnosis(es) in detail. Medication risks, benefits, costs, interactions, and alternatives were discussed as indicated. I advised her to contact the office if her condition worsens, changes or fails to improve as anticipated. She expressed understanding with the diagnosis(es) and plan. Jasmin Colon, who was evaluated through a patient-initiated, synchronous (real-time) audio-video encounter, and/or her healthcare decision maker, is aware that it is a billable service, with coverage as determined by her insurance carrier. She provided verbal consent to proceed: Yes, and patient identification was verified. It was conducted pursuant to the emergency declaration under the [address] [place] [address] authority and the [employer]ar General Act. A caregiver was present when appropriate. Ability to conduct physical exam was limited. I was in the office. The patient was at home.       Michelle Abarca NP

## 2021-05-28 ENCOUNTER — VIRTUAL VISIT (OUTPATIENT)
Dept: FAMILY MEDICINE CLINIC | Age: 26
End: 2021-05-28
Payer: COMMERCIAL

## 2021-05-28 DIAGNOSIS — F41.9 ANXIETY: ICD-10-CM

## 2021-05-28 PROBLEM — I10 ESSENTIAL HYPERTENSION: Status: ACTIVE | Noted: 2021-04-26

## 2021-05-28 PROBLEM — K21.9 GASTROESOPHAGEAL REFLUX DISEASE: Status: ACTIVE | Noted: 2021-04-26

## 2021-05-28 PROCEDURE — 99213 OFFICE O/P EST LOW 20 MIN: CPT | Performed by: NURSE PRACTITIONER

## 2021-05-28 RX ORDER — ESCITALOPRAM OXALATE 20 MG/1
10 TABLET ORAL DAILY
Qty: 30 TABLET | Refills: 1 | Status: SHIPPED | OUTPATIENT
Start: 2021-05-28 | End: 2021-08-25 | Stop reason: SDUPTHER

## 2021-05-28 NOTE — PROGRESS NOTES
Juana Koenig is a 31 y.o. female who was seen by synchronous (real-time) audio-video technology on 5/28/2021 for Follow-up (anxiety)        Assessment & Plan:   Diagnoses and all orders for this visit:    1. Anxiety  -     escitalopram oxalate (LEXAPRO) 20 mg tablet; Take 0.5 Tablets by mouth daily. Indications: anxiousness associated with depression        I spent at least 20 minutes on this visit with this established patient. 712  Subjective: Anxiety  Patient previously started on Lexapro 10 mg on previous visit. She states she have been taking it as prescribed without side effects but does not feel it have done anything for her. She still worries about everything and not sure why. She still is not sure how to stop making other people problems her own. She stated she cut off her phone for 24 hours and felt great. Discussed with patient on see a therapist to help assist her and she agreed. Patient denies SI or wanting to hurt others. Today I will increase her lexapro to 20 mg and reassess in 4 weeks. Patient will start looking for a therapist.    Essential Hypertension  Patient have the diagnosis of hypertension but states she is unsure if she have it. She does not take any medications and feels this may have happen at a particular time in her life because of stress or anxiety. She is currently no on any medications. Advised patient to monitor blood pressure readings and bring at next visit. Televisit disconnect, tried to call patient with no answer. Prior to Admission medications    Medication Sig Start Date End Date Taking? Authorizing Provider   escitalopram oxalate (LEXAPRO) 10 mg tablet Take 1 Tab by mouth daily. Indications: anxiousness associated with depression 4/23/21  Yes Jenise Santiago NP   fexofenadine (ALLEGRA) 180 mg tablet Take  by mouth. Yes Other, MD José   pantoprazole (Protonix) 40 mg tablet Take 1 Tab by mouth daily.  4/21/21  Yes Macario Bond MD clindamycin (CLEOCIN) 300 mg capsule Take 300 mg by mouth three (3) times daily. Patient not taking: Reported on 5/28/2021    Other, MD José     Patient Active Problem List   Diagnosis Code    Dysmenorrhea N94.6    Anxiety F41.9    Essential hypertension I10    Gastroesophageal reflux disease K21.9     Current Outpatient Medications   Medication Sig Dispense Refill    escitalopram oxalate (LEXAPRO) 20 mg tablet Take 0.5 Tablets by mouth daily. Indications: anxiousness associated with depression 30 Tablet 1    fexofenadine (ALLEGRA) 180 mg tablet Take  by mouth.  pantoprazole (Protonix) 40 mg tablet Take 1 Tab by mouth daily. 30 Tab 0     Past Medical History:   Diagnosis Date    COVID-19     GERD (gastroesophageal reflux disease)      Past Surgical History:   Procedure Laterality Date    HX ORTHOPAEDIC       Family History   Problem Relation Age of Onset    Hypertension Mother     Cancer Maternal Grandfather        Review of Systems   Constitutional: Negative for chills, fever and malaise/fatigue. Eyes: Negative. Respiratory: Negative for cough, shortness of breath and wheezing. Cardiovascular: Negative for chest pain, palpitations and leg swelling. Musculoskeletal: Negative. Skin: Negative. Neurological: Negative. Psychiatric/Behavioral: The patient is nervous/anxious. Objective:     Patient-Reported Vitals 5/28/2021   Patient-Reported Weight 163 lbs   Patient-Reported LMP 05-      General: alert, cooperative, no distress   Mental  status: normal mood, behavior, speech, dress, motor activity, and thought processes, able to follow commands   HENT: NCAT   Neck: no visualized mass   Resp: no respiratory distress   Neuro: no gross deficits   Skin: no discoloration or lesions of concern on visible areas   Psychiatric: normal affect, consistent with stated mood, no evidence of hallucinations     Additional exam findings:        We discussed the expected course, resolution and complications of the diagnosis(es) in detail. Medication risks, benefits, costs, interactions, and alternatives were discussed as indicated. I advised her to contact the office if her condition worsens, changes or fails to improve as anticipated. She expressed understanding with the diagnosis(es) and plan. Ivania Dhillon, who was evaluated through a patient-initiated, synchronous (real-time) audio-video encounter, and/or her healthcare decision maker, is aware that it is a billable service, with coverage as determined by her insurance carrier. She provided verbal consent to proceed: Yes, and patient identification was verified. It was conducted pursuant to the emergency declaration under the [facility], [address] [place] [address] authority and the [employer] General Act. A caregiver was present when appropriate. Ability to conduct physical exam was limited. I was in the office. The patient was at home.       Ramesh Barrett NP

## 2021-05-28 NOTE — PROGRESS NOTES
Chief Complaint   Patient presents with    Follow-up     anxiety     1. Have you been to the ER, urgent care clinic since your last visit? Hospitalized since your last visit? No    2. Have you seen or consulted any other health care providers outside of the [address] [place] [address] since your last visit? Include any pap smears or colon screening.  No

## 2021-08-25 DIAGNOSIS — F41.9 ANXIETY: ICD-10-CM

## 2021-08-25 RX ORDER — ESCITALOPRAM OXALATE 20 MG/1
10 TABLET ORAL DAILY
Qty: 30 TABLET | Refills: 1 | Status: SHIPPED | OUTPATIENT
Start: 2021-08-25 | End: 2022-02-23

## 2021-08-25 NOTE — TELEPHONE ENCOUNTER
Requested Prescriptions     Pending Prescriptions Disp Refills    escitalopram oxalate (LEXAPRO) 20 mg tablet 30 Tablet 1     Sig: Take 0.5 Tablets by mouth daily. Indications: anxiousness associated with depression   PT STATED SHE IS COMPLETELY OUT. PT HAS BEEN SCHEDULED BUT WILL NOT HAVE ANY MEDICATION UNTIL THEN.  PLEASE BE ADVISED

## 2021-08-25 NOTE — TELEPHONE ENCOUNTER
Please see refill request    Patient was last seen on 5-    Last prescribed on 5-   #30 X 1    Patient was to return in 4 weeks for follow up, patient has appointment scheduled for 8-    Thank you

## 2021-08-27 ENCOUNTER — VIRTUAL VISIT (OUTPATIENT)
Dept: FAMILY MEDICINE CLINIC | Age: 26
End: 2021-08-27
Payer: COMMERCIAL

## 2021-08-27 DIAGNOSIS — K21.00 GASTROESOPHAGEAL REFLUX DISEASE WITH ESOPHAGITIS WITHOUT HEMORRHAGE: ICD-10-CM

## 2021-08-27 DIAGNOSIS — R20.0 NUMBNESS AND TINGLING OF RIGHT ARM: ICD-10-CM

## 2021-08-27 DIAGNOSIS — R20.0 NUMBNESS AND TINGLING IN RIGHT HAND: ICD-10-CM

## 2021-08-27 DIAGNOSIS — R20.2 NUMBNESS AND TINGLING IN RIGHT HAND: ICD-10-CM

## 2021-08-27 DIAGNOSIS — R20.2 NUMBNESS AND TINGLING OF RIGHT ARM: ICD-10-CM

## 2021-08-27 PROCEDURE — 99213 OFFICE O/P EST LOW 20 MIN: CPT | Performed by: NURSE PRACTITIONER

## 2021-08-27 RX ORDER — PREDNISONE 20 MG/1
TABLET ORAL
Qty: 18 TABLET | Refills: 0 | Status: SHIPPED | OUTPATIENT
Start: 2021-08-27

## 2021-08-27 RX ORDER — FAMOTIDINE 20 MG/1
20 TABLET, FILM COATED ORAL AS NEEDED
COMMUNITY
End: 2021-08-27 | Stop reason: CLARIF

## 2021-08-27 RX ORDER — PHENOL/SODIUM PHENOLATE
20 AEROSOL, SPRAY (ML) MUCOUS MEMBRANE DAILY
Qty: 30 TABLET | Refills: 1 | Status: SHIPPED | OUTPATIENT
Start: 2021-08-27 | End: 2021-08-31

## 2021-08-27 NOTE — PROGRESS NOTES
Pepito Becerra presents today for   Chief Complaint   Patient presents with   [facility] ED Follow-up     possible gerd/acid reflux       Is someone accompanying this pt? no    Is the patient using any DME equipment during OV? no    Depression Screening:  3 most recent PHQ Screens 8/27/2021   Little interest or pleasure in doing things Not at all   Feeling down, depressed, irritable, or hopeless Not at all   Total Score PHQ 2 0   Trouble falling or staying asleep, or sleeping too much Several days   Feeling tired or having little energy Several days   Poor appetite, weight loss, or overeating Not at all   Feeling bad about yourself - or that you are a failure or have let yourself or your family down Not at all   Trouble concentrating on things such as school, work, reading, or watching TV Not at all   Moving or speaking so slowly that other people could have noticed; or the opposite being so fidgety that others notice Not at all   Thoughts of being better off dead, or hurting yourself in some way Not at all   PHQ 9 Score 2   How difficult have these problems made it for you to do your work, take care of your home and get along with others Not difficult at all       Learning Assessment:  No flowsheet data found. Abuse Screening:  No flowsheet data found. Fall Risk  No flowsheet data found. Health Maintenance reviewed and discussed and ordered per Provider. Health Maintenance Due   Topic Date Due    Hepatitis C Screening  Never done    COVID-19 Vaccine (1) Never done    DTaP/Tdap/Td series (1 - Tdap) Never done   . Coordination of Care:  1. Have you been to the ER, urgent care clinic since your last visit? Hospitalized since your last visit? ED  [facility] chest tightness    2. Have you seen or consulted any other health care providers outside of the [address] [place] [address] since your last visit? Include any pap smears or colon screening.  no      Last  Checked no  Last UDS Checked no  Last Pain contract signed: no

## 2021-08-27 NOTE — PROGRESS NOTES
Curt Meehan is a 31 y.o. female who was seen by synchronous (real-time) audio-video technology on 8/27/2021 for ED Follow-up (possible gerd/acid reflux)        Assessment & Plan:   Diagnoses and all orders for this visit:    1. Gastroesophageal reflux disease with esophagitis without hemorrhage  -     Omeprazole delayed release (PRILOSEC D/R) 20 mg tablet; Take 1 Tablet by mouth daily. 2. Numbness and tingling in right hand  -     REFERRAL TO NEUROLOGY  -     predniSONE (DELTASONE) 20 mg tablet; Take 3 tablets daily by mouth for 3 days, then take 2 tablets daily by mouth for 3 days, then take 1 tablet daily by mouth for 3 days. 3. Numbness and tingling of right arm  -     REFERRAL TO NEUROLOGY        I spent at least 30 minutes on this visit with this established patient. 712  Subjective:     Patient is following up today for hospital ER visit on 8/25/2021 for chest pressure/numbness at [facility]. Patient had presented with burning chest pain. She have a history of GERD and recent heavy Etoh use. Patient declined any medications in the ER and stated will take Pepcid and other home medications and was discharged. Patient had a EKG and chest xray was WNL. GERD/Numbness and tingling  Today patient states she does not think it was her GERD because she had numbness and tingling going from her right shoulder to her right fingers. She denies any injuries but states she is still have the numbness and tingling today. Educated patient on GERD symptoms today. She also stated she have not been taking the pepcid and when she did after the ER visit it did not help she still feel heaviness in her chest.  Patient denies SOB. 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 Referral written to follow up with neurology and will write prescription for prednisone.       Possible HTN  Patient states she have not been taking her blood pressure but when she did a few days back it was 150's over 100. Advised patient if elevated and she is having symptoms she should go to the ER. Patient express understanding. Advised patient to monitor and will request her to be followed up within the next 2 weeks in the office to assess. Prior to Admission medications    Medication Sig Start Date End Date Taking? Authorizing Provider   famotidine (Pepcid) 20 mg tablet Take 20 mg by mouth as needed for Heartburn. Yes Provider, Historical   escitalopram oxalate (LEXAPRO) 20 mg tablet Take 0.5 Tablets by mouth daily. Indications: anxiousness associated with depression 8/25/21  Yes Elo Anaya MD   fexofenadine (ALLEGRA) 180 mg tablet Take  by mouth. Patient not taking: Reported on 8/27/2021    Other, MD José   pantoprazole (Protonix) 40 mg tablet Take 1 Tab by mouth daily. Patient not taking: Reported on 8/27/2021 4/21/21   Lynne Smith MD     Patient Active Problem List   Diagnosis Code    Dysmenorrhea N94.6    Anxiety F41.9    Essential hypertension I10    Gastroesophageal reflux disease K21.9     Current Outpatient Medications   Medication Sig Dispense Refill    Omeprazole delayed release (PRILOSEC D/R) 20 mg tablet Take 1 Tablet by mouth daily. 30 Tablet 1    predniSONE (DELTASONE) 20 mg tablet Take 3 tablets daily by mouth for 3 days, then take 2 tablets daily by mouth for 3 days, then take 1 tablet daily by mouth for 3 days. 18 Tablet 0    escitalopram oxalate (LEXAPRO) 20 mg tablet Take 0.5 Tablets by mouth daily. Indications: anxiousness associated with depression 30 Tablet 1     No Known Allergies  Social History     Tobacco Use    Smoking status: Never Smoker    Smokeless tobacco: Never Used   Substance Use Topics    Alcohol use: Yes     Comment: occasionally       Review of Systems   Constitutional: Negative for chills, fever and malaise/fatigue. Eyes: Negative. Respiratory: Negative for cough, shortness of breath and wheezing. Cardiovascular: Negative for chest pain, palpitations and leg swelling. Musculoskeletal: Negative. Skin: Negative. Neurological: Positive for tingling. Psychiatric/Behavioral: Negative. Objective:     Patient-Reported Vitals 8/27/2021   Patient-Reported Weight 164   Patient-Reported Systolic  160   Patient-Reported Diastolic 94   Patient-Reported LMP 8-      General: alert, cooperative, no distress   Mental  status: normal mood, behavior, speech, dress, motor activity, and thought processes, able to follow commands   HENT: NCAT   Neck: no visualized mass   Resp: no respiratory distress   Neuro: no gross deficits   Skin: no discoloration or lesions of concern on visible areas   Psychiatric: normal affect, consistent with stated mood, no evidence of hallucinations     Additional exam findings: We discussed the expected course, resolution and complications of the diagnosis(es) in detail. Medication risks, benefits, costs, interactions, and alternatives were discussed as indicated. I advised her to contact the office if her condition worsens, changes or fails to improve as anticipated. She expressed understanding with the diagnosis(es) and plan. Curry Abreu, who was evaluated through a patient-initiated, synchronous (real-time) audio-video encounter, and/or her healthcare decision maker, is aware that it is a billable service, with coverage as determined by her insurance carrier. She provided verbal consent to proceed: Yes, and patient identification was verified. It was conducted pursuant to the emergency declaration under the [facility], [address] [place] [address] authority and the [employer] General Act. A caregiver was present when appropriate. Ability to conduct physical exam was limited. I was in the office. The patient was at home.       Ky Wick NP

## 2021-08-30 ENCOUNTER — TELEPHONE (OUTPATIENT)
Dept: FAMILY MEDICINE CLINIC | Age: 26
End: 2021-08-30

## 2021-08-31 DIAGNOSIS — K21.00 GASTROESOPHAGEAL REFLUX DISEASE WITH ESOPHAGITIS WITHOUT HEMORRHAGE: Primary | ICD-10-CM

## 2021-08-31 RX ORDER — OMEPRAZOLE 40 MG/1
40 CAPSULE, DELAYED RELEASE ORAL DAILY
Qty: 90 CAPSULE | Refills: 1 | Status: SHIPPED | OUTPATIENT
Start: 2021-08-31

## 2021-08-31 NOTE — TELEPHONE ENCOUNTER
Not sure where the Nexium is coming from but can you change the omeprazole from tablets to capsule so her insurance will pay for it?     Thank you

## 2022-01-07 ENCOUNTER — VIRTUAL VISIT (OUTPATIENT)
Dept: FAMILY MEDICINE CLINIC | Age: 27
End: 2022-01-07
Payer: COMMERCIAL

## 2022-01-07 DIAGNOSIS — Z13.228 SCREENING FOR ENDOCRINE, METABOLIC AND IMMUNITY DISORDER: ICD-10-CM

## 2022-01-07 DIAGNOSIS — Z13.0 SCREENING FOR ENDOCRINE, METABOLIC AND IMMUNITY DISORDER: ICD-10-CM

## 2022-01-07 DIAGNOSIS — J02.0 STREP THROAT: ICD-10-CM

## 2022-01-07 DIAGNOSIS — Z13.6 SCREENING FOR CARDIOVASCULAR CONDITION: ICD-10-CM

## 2022-01-07 DIAGNOSIS — Z13.29 SCREENING FOR ENDOCRINE, METABOLIC AND IMMUNITY DISORDER: ICD-10-CM

## 2022-01-07 PROCEDURE — 99213 OFFICE O/P EST LOW 20 MIN: CPT | Performed by: NURSE PRACTITIONER

## 2022-01-07 RX ORDER — CEPHALEXIN 500 MG/1
500 CAPSULE ORAL 2 TIMES DAILY
COMMUNITY
Start: 2022-01-05 | End: 2022-01-15

## 2022-01-07 RX ORDER — FEXOFENADINE HCL 60 MG
60 TABLET ORAL 2 TIMES DAILY
COMMUNITY
End: 2022-03-31

## 2022-01-07 RX ORDER — CEPHALEXIN 500 MG/1
CAPSULE ORAL
COMMUNITY
Start: 2022-01-05 | End: 2022-01-07 | Stop reason: SDUPTHER

## 2022-01-07 NOTE — PROGRESS NOTES
Ayana Sanchez is a 31 y.o. female who was seen by synchronous (real-time) audio-video technology on 1/7/2022 for Other (continually getting strep throat)        Assessment & Plan:   Diagnoses and all orders for this visit:    1. Strep throat  -     REFERRAL TO ENT-OTOLARYNGOLOGY    2. Screening for endocrine, metabolic and immunity disorder  -     CBC W/O DIFF; Future  -     METABOLIC PANEL, COMPREHENSIVE; Future  -     LIPID PANEL; Future    3. Screening for cardiovascular condition  -     LIPID PANEL; Future        I spent at least 20 minutes on this visit with this established patient. 712  Subjective:       Repeated Strep Throat  Patient was seen in the ER on 1/4/2022 and diagnosed with Strep pharyngitis. Patient was prescribed Keflex for 10 days. Patient states this is a recurrent thing with getting strep throat. She states previously she also was diagnosed with strep in July and March of 2021. Patient is currently on antibiotic at this time. She is wanting a further workup. Will refer patient to ENT. Patient denies SOB or sore throat at this time. Informed patient to continue antibiotic until it is completed. No other concerns today    Ordered routine lab work today        Past Medical History:   Diagnosis Date    COVID-19     Depression     GERD (gastroesophageal reflux disease)         Prior to Admission medications    Medication Sig Start Date End Date Taking? Authorizing Provider   cephALEXin (KEFLEX) 500 mg capsule Take 500 mg by mouth two (2) times a day. 1/5/22 1/15/22 Yes Provider, Historical   fexofenadine (ALLEGRA) 60 mg tablet Take 60 mg by mouth two (2) times a day. Provider, Historical   cephALEXin (KEFLEX) 500 mg capsule  1/5/22 1/7/22  Provider, Historical   omeprazole (PRILOSEC) 40 mg capsule Take 1 Capsule by mouth daily.   Patient not taking: Reported on 1/7/2022 8/31/21   Tobi Santiago, ALONZO   predniSONE (DELTASONE) 20 mg tablet Take 3 tablets daily by mouth for 3 days, then take 2 tablets daily by mouth for 3 days, then take 1 tablet daily by mouth for 3 days. Patient not taking: Reported on 1/7/2022 8/27/21   Jack Jerrell Ronna WILLS NP   escitalopram oxalate (LEXAPRO) 20 mg tablet Take 0.5 Tablets by mouth daily. Indications: anxiousness associated with depression  Patient not taking: Reported on 1/7/2022 8/25/21   Baljinder Tony MD     Patient Active Problem List   Diagnosis Code    Dysmenorrhea N94.6    Anxiety F41.9    Essential hypertension I10    Gastroesophageal reflux disease K21.9     Current Outpatient Medications   Medication Sig Dispense Refill    cephALEXin (KEFLEX) 500 mg capsule Take 500 mg by mouth two (2) times a day.  fexofenadine (ALLEGRA) 60 mg tablet Take 60 mg by mouth two (2) times a day.  omeprazole (PRILOSEC) 40 mg capsule Take 1 Capsule by mouth daily. (Patient not taking: Reported on 1/7/2022) 90 Capsule 1    predniSONE (DELTASONE) 20 mg tablet Take 3 tablets daily by mouth for 3 days, then take 2 tablets daily by mouth for 3 days, then take 1 tablet daily by mouth for 3 days. (Patient not taking: Reported on 1/7/2022) 18 Tablet 0    escitalopram oxalate (LEXAPRO) 20 mg tablet Take 0.5 Tablets by mouth daily. Indications: anxiousness associated with depression (Patient not taking: Reported on 1/7/2022) 30 Tablet 1     Allergies   Allergen Reactions    Amoxicillin Itching     Family History   Problem Relation Age of Onset    Hypertension Mother     Cancer Maternal Grandfather        Review of Systems   Constitutional: Negative for chills, fever and malaise/fatigue. Eyes: Negative. Respiratory: Negative for cough, shortness of breath and wheezing. Cardiovascular: Negative for chest pain, palpitations and leg swelling. Musculoskeletal: Negative. Skin: Negative. Neurological: Negative.         Objective:     Patient-Reported Vitals 1/7/2022   Patient-Reported Weight 178   Patient-Reported Systolic  - Patient-Reported Diastolic -   Patient-Reported LMP 12-      General: alert, cooperative, no distress   Mental  status: normal mood, behavior, speech, dress, motor activity, and thought processes, able to follow commands   HENT: NCAT   Neck: no visualized mass   Resp: no respiratory distress   Neuro: no gross deficits   Skin: no discoloration or lesions of concern on visible areas   Psychiatric: normal affect, consistent with stated mood, no evidence of hallucinations     Additional exam findings: We discussed the expected course, resolution and complications of the diagnosis(es) in detail. Medication risks, benefits, costs, interactions, and alternatives were discussed as indicated. I advised her to contact the office if her condition worsens, changes or fails to improve as anticipated. She expressed understanding with the diagnosis(es) and plan. Milagro Vick, who was evaluated through a patient-initiated, synchronous (real-time) audio-video encounter, and/or her healthcare decision maker, is aware that it is a billable service, with coverage as determined by her insurance carrier. She provided verbal consent to proceed: Yes, and patient identification was verified. It was conducted pursuant to the emergency declaration under the [facility], [address] [place] [address] authority and the [employer] and [employer] General Act. A caregiver was present when appropriate. Ability to conduct physical exam was limited. I was in the office. The patient was at home.       Antonio Vo NP

## 2022-01-11 ENCOUNTER — TELEPHONE (OUTPATIENT)
Dept: FAMILY MEDICINE CLINIC | Age: 27
End: 2022-01-11

## 2022-01-11 NOTE — TELEPHONE ENCOUNTER
Patient would like an order for a chest x-ray to go to [facility]-she needs this for school because it is to late for her to have a second step PPD test.  She needs this resulted by 5pm tomorrow (1-)    Any suggestions please advise    Thank you

## 2022-01-11 NOTE — TELEPHONE ENCOUNTER
Pt called stating she needs an order put in for a negative chest xray  and a order for a tb test for school at [facility]. Before 3pm tomorrow. [phone]. Please advise.  Thank you!!!!

## 2022-01-12 DIAGNOSIS — Z13.6 SCREENING FOR CARDIOVASCULAR CONDITION: Primary | ICD-10-CM

## 2022-01-12 NOTE — PROGRESS NOTES
Patient called today stating she needed a chest xray for entrance into school. Our xray tech is not in the office this week. Patient will come to the office to pickup order and have completed at [facility]. Chest xray order placed.

## 2022-01-20 ENCOUNTER — TELEPHONE (OUTPATIENT)
Dept: FAMILY MEDICINE CLINIC | Age: 27
End: 2022-01-20

## 2022-01-20 NOTE — TELEPHONE ENCOUNTER
Please re submit the referral to the ENT provider.  Pt called to schedule an appointment and was told they never received the referral. Please follow up when available

## 2022-01-20 NOTE — TELEPHONE ENCOUNTER
Spoke with patient and advised the referral was refaxed to [facility].    ----- Message from Jorgito Jensen sent at 1/20/2022  9:30 AM EST -----  Subject: Message to Provider    QUESTIONS  Information for Provider? Patient is calling in regards to ENT referral.   The patient states that she has been waiting for 2 weeks to have this   referral sent for recurring strep throat. The patient states that \"she   spoke with [facility] in the office yesterday, and this was supposed to have   been faxed, but ENT has still not received it. \" Please call the patient  ---------------------------------------------------------------------------  --------------  CALL BACK INFO  What is the best way for the office to contact you? OK to leave message on   voicemail  Preferred Call Back Phone Number? [phone]  ---------------------------------------------------------------------------  --------------  SCRIPT ANSWERS  Relationship to Patient?  Self

## 2022-01-20 NOTE — TELEPHONE ENCOUNTER
Re-sent referral and notes to:    Kei Green  [address] [place] [address]  [phone] fax for referrals    Received fax confirmation

## 2022-02-02 ENCOUNTER — HOSPITAL ENCOUNTER (OUTPATIENT)
Dept: LAB | Age: 27
Discharge: HOME | End: 2022-02-02

## 2022-02-02 LAB — SENTARA SPECIMEN COL,SENBCF: NORMAL

## 2022-02-02 PROCEDURE — 99001 SPECIMEN HANDLING PT-LAB: CPT

## 2022-02-03 LAB
A-G RATIO,AGRAT: 1.7 RATIO (ref 1.1–2.6)
ALBUMIN SERPL-MCNC: 4.6 G/DL (ref 3.5–5)
ALP SERPL-CCNC: 76 U/L (ref 25–115)
ALT SERPL-CCNC: 16 U/L (ref 5–40)
ANION GAP SERPL CALC-SCNC: 11 MMOL/L (ref 3–15)
AST SERPL W P-5'-P-CCNC: 18 U/L (ref 10–37)
BILIRUB SERPL-MCNC: 0.9 MG/DL (ref 0.2–1.2)
BUN SERPL-MCNC: 8 MG/DL (ref 6–22)
CALCIUM SERPL-MCNC: 9.5 MG/DL (ref 8.4–10.5)
CHLORIDE SERPL-SCNC: 103 MMOL/L (ref 98–110)
CHOLEST SERPL-MCNC: 176 MG/DL (ref 110–200)
CO2 SERPL-SCNC: 26 MMOL/L (ref 20–32)
CREAT SERPL-MCNC: 0.9 MG/DL (ref 0.5–1.2)
ERYTHROCYTE [DISTWIDTH] IN BLOOD BY AUTOMATED COUNT: 11.9 % (ref 10–15.5)
GFRAA, 66117: >60
GFRNA, 66118: >60
GLOBULIN,GLOB: 2.7 G/DL (ref 2–4)
GLUCOSE SERPL-MCNC: 74 MG/DL (ref 70–99)
HCT VFR BLD AUTO: 40.7 % (ref 35.1–46.5)
HDLC SERPL-MCNC: 3.5 MG/DL (ref 0–5)
HDLC SERPL-MCNC: 50 MG/DL
HGB BLD-MCNC: 13.1 G/DL (ref 11.7–15.5)
LDL/HDL RATIO,LDHD: 2.2
LDLC SERPL CALC-MCNC: 108 MG/DL (ref 50–99)
MCH RBC QN AUTO: 32 PG (ref 26–34)
MCHC RBC AUTO-ENTMCNC: 32 G/DL (ref 31–36)
MCV RBC AUTO: 98 FL (ref 80–99)
NON-HDL CHOLESTEROL, 011976: 126 MG/DL
PLATELET # BLD AUTO: 315 K/UL (ref 140–440)
PMV BLD AUTO: 10.4 FL (ref 9–13)
POTASSIUM SERPL-SCNC: 4 MMOL/L (ref 3.5–5.5)
PROT SERPL-MCNC: 7.3 G/DL (ref 6.4–8.3)
RBC # BLD AUTO: 4.16 M/UL (ref 3.8–5.2)
SODIUM SERPL-SCNC: 140 MMOL/L (ref 133–145)
TRIGL SERPL-MCNC: 88 MG/DL (ref 40–149)
VLDLC SERPL CALC-MCNC: 18 MG/DL (ref 8–30)
WBC # BLD AUTO: 5.9 K/UL (ref 4–11)

## 2022-02-04 NOTE — PROGRESS NOTES
Inform patient that her labs are reassuring. Her LDL cholesterol is minimally elevated at 108. It should be below 100. Advise patient to incorporate healthy lifestyle changes with diet and exercise.

## 2022-02-08 NOTE — PROGRESS NOTES
After obtaining identifiers patient was made aware of all lab resutls and recommnedations.   Patient verbalized understanding

## 2022-02-23 ENCOUNTER — VIRTUAL VISIT (OUTPATIENT)
Dept: FAMILY MEDICINE CLINIC | Age: 27
End: 2022-02-23
Payer: COMMERCIAL

## 2022-02-23 DIAGNOSIS — F41.9 ANXIETY: ICD-10-CM

## 2022-02-23 DIAGNOSIS — B37.31 VAGINAL YEAST INFECTION: ICD-10-CM

## 2022-02-23 PROCEDURE — 99214 OFFICE O/P EST MOD 30 MIN: CPT | Performed by: FAMILY MEDICINE

## 2022-02-23 RX ORDER — BUSPIRONE HYDROCHLORIDE 5 MG/1
5 TABLET ORAL 2 TIMES DAILY
Qty: 60 TABLET | Refills: 0 | Status: SHIPPED | OUTPATIENT
Start: 2022-02-23 | End: 2022-03-17 | Stop reason: SDUPTHER

## 2022-02-23 RX ORDER — FLUCONAZOLE 150 MG/1
150 TABLET ORAL DAILY
Qty: 2 TABLET | Refills: 0 | Status: SHIPPED | OUTPATIENT
Start: 2022-02-23 | End: 2022-02-24

## 2022-02-23 NOTE — PROGRESS NOTES
Chief Complaint   Patient presents with    Anxiety     Medication Change     1. \"Have you been to the ER, urgent care clinic since your last visit? Hospitalized since your last visit? \" No    2. \"Have you seen or consulted any other health care providers outside of the [address] [place] [address] since your last visit? \" No     3. For patients aged 39-70: Has the patient had a colonoscopy / FIT/ Cologuard? NA - based on age      If the patient is female:    4. For patients aged 41-77: Has the patient had a mammogram within the past 2 years? NA - based on age or sex      11. For patients aged 21-65: Has the patient had a pap smear?  No

## 2022-02-24 NOTE — PROGRESS NOTES
Ricardo Faulkner (: 1995) is a 31 y.o. female, established patient, here for evaluation of the following chief complaint(s): Anxiety (Medication Change)       ASSESSMENT/PLAN:  Below is the assessment and plan developed based on review of pertinent history, labs, studies, and medications. 1. Anxiety  -     busPIRone (BUSPAR) 5 mg tablet; Take 1 Tablet by mouth two (2) times a day for 30 days. , Normal, Disp-60 Tablet, R-0  2. Vaginal yeast infection  -     fluconazole (DIFLUCAN) 150 mg tablet; Take 1 Tablet by mouth daily for 1 day. FDA advises cautious prescribing of oral fluconazole in pregnancy. If first dose does not work repeat dose in 72 hours. , Normal, Disp-2 Tablet, R-0      Return in about 4 weeks (around 3/23/2022), or if symptoms worsen or fail to improve, for follow up anxiety. SUBJECTIVE/OBJECTIVE:  HPI     This is a 50-year-old -American female with past medical history significant for anxiety who is here to follow-up on this. Generalized anxiety disorder  YESENIA-7 score of 13, PHQ-9 score of 9. She states that she sees a therapist who told her that maybe she needs something as needed for her anxiety. Patient has tried Lexapro in the past but this has not worked for her. She states that she tried it for 3 months. Patient currently denies any suicidal homicidal ideation. Denies any previous attempts. States that she has a good support system in her family and friends. Patient is willing to try buspirone and advised her to seek psychiatric help should she want other medication for anxiety. We will start buspirone 5 mg twice daily. Counseled patient on risks and benefits of the medication and side effects. Patient understands and is in agreement with the plan. Vaginal candidal infection  Patient states that she was seen in urgent care for a sinus infection where they gave her antibiotics for and on taking these antibiotics she started having a yeast infection.   She states that she has a history of yeast infections and this feels like it. She states that she has vaginal irritation, and vaginal discharge. Patient will be given Diflucan. Review of Systems   Constitutional: Negative for activity change, fatigue and fever. HENT: Negative for congestion and ear pain. Eyes: Negative for photophobia and pain. Respiratory: Negative for cough, chest tightness and shortness of breath. Cardiovascular: Negative for chest pain and palpitations. Genitourinary: Positive for vaginal discharge. Negative for dysuria. Psychiatric/Behavioral: Positive for sleep disturbance. Negative for suicidal ideas. The patient is nervous/anxious.          No data recorded     Physical Exam    [INSTRUCTIONS:  \"[x]\" Indicates a positive item  \"[]\" Indicates a negative item  -- DELETE ALL ITEMS NOT EXAMINED]    Constitutional: [x] Appears well-developed and well-nourished [x] No apparent distress      [] Abnormal -     Mental status: [x] Alert and awake  [x] Oriented to person/place/time [x] Able to follow commands    [] Abnormal -     Eyes:   EOM    [x]  Normal    [] Abnormal -   Sclera  [x]  Normal    [] Abnormal -          Discharge [x]  None visible   [] Abnormal -     HENT: [x] Normocephalic, atraumatic  [] Abnormal -   [x] Mouth/Throat: Mucous membranes are moist    External Ears [x] Normal  [] Abnormal -    Neck: [x] No visualized mass [] Abnormal -     Pulmonary/Chest: [x] Respiratory effort normal   [x] No visualized signs of difficulty breathing or respiratory distress        [] Abnormal -      Musculoskeletal:   [x] Normal gait with no signs of ataxia         [x] Normal range of motion of neck        [] Abnormal -     Neurological:        [x] No Facial Asymmetry (Cranial nerve 7 motor function) (limited exam due to video visit)          [x] No gaze palsy        [] Abnormal -          Skin:        [x] No significant exanthematous lesions or discoloration noted on facial skin         [] Abnormal -            Psychiatric:       [x] Normal Affect [] Abnormal -        [x] No Hallucinations    Other pertinent observable physical exam findings:-        On this date 02/23/2022 I have spent 20 minutes reviewing previous notes, test results and face to face (virtual) with the patient discussing the diagnosis and importance of compliance with the treatment plan as well as documenting on the day of the visit. Karin Hardy, was evaluated through a synchronous (real-time) audio-video encounter. The patient (or guardian if applicable) is aware that this is a billable service, which includes applicable co-pays. Verbal consent to proceed has been obtained. The visit was conducted pursuant to the emergency declaration under the [address] [place] [address] authority and the [employer] General Act. Patient identification was verified, and a caregiver was present when appropriate. The patient was located at home in a state where the provider was licensed to provide care. An electronic signature was used to authenticate this note.   -- Odessa Gama MD

## 2022-03-17 DIAGNOSIS — F41.9 ANXIETY: ICD-10-CM

## 2022-03-17 NOTE — TELEPHONE ENCOUNTER
This pharmacy faxed over request for the following prescriptions to be filled:    Medication requested :   Requested Prescriptions     Pending Prescriptions Disp Refills    busPIRone (BUSPAR) 5 mg tablet 60 Tablet 0     Sig: Take 1 Tablet by mouth two (2) times a day for 30 days.        PCP: Dr. Krissy Rush or Print: [facility]  Mail order or Local pharmacy: [facility]    Scheduled appointment if not seen by current providers in office: 3/24/22    90 days supply: Request for Authorization

## 2022-03-18 NOTE — TELEPHONE ENCOUNTER
Please see refill request    Patient was last seen on 2-    Last prescribed on 2-  #60 X 0    Patient has upcoming appt on 3-    Thank you

## 2022-03-21 DIAGNOSIS — F41.9 ANXIETY: ICD-10-CM

## 2022-03-21 RX ORDER — BUSPIRONE HYDROCHLORIDE 5 MG/1
5 TABLET ORAL 2 TIMES DAILY
Qty: 60 TABLET | Refills: 0 | Status: SHIPPED | OUTPATIENT
Start: 2022-03-21 | End: 2022-04-20

## 2022-03-21 NOTE — TELEPHONE ENCOUNTER
This pharmacy faxed over request for the following prescriptions to be filled:    Medication requested :   Requested Prescriptions     Pending Prescriptions Disp Refills    busPIRone (BUSPAR) 5 mg tablet 60 Tablet 0     Sig: Take 1 Tablet by mouth two (2) times a day for 30 days.        PCP: Dr. Angie Juares or Print: [facility]  Mail order or Local pharmacy: [facility]    Scheduled appointment if not seen by current providers in office: 3/24/22

## 2022-03-22 RX ORDER — BUSPIRONE HYDROCHLORIDE 5 MG/1
5 TABLET ORAL 2 TIMES DAILY
Qty: 60 TABLET | Refills: 0 | OUTPATIENT
Start: 2022-03-22 | End: 2022-04-21

## 2022-03-28 DIAGNOSIS — F41.9 ANXIETY: ICD-10-CM

## 2022-03-28 NOTE — TELEPHONE ENCOUNTER
This patient contacted office for the following prescriptions to be filled:    Medication requested :   Requested Prescriptions     Pending Prescriptions Disp Refills    escitalopram oxalate (LEXAPRO) 20 mg tablet 30 Tablet 1     Sig: Take 0.5 Tablets by mouth daily.  Indications: anxiousness associated with depression       PCP: Dr. Sweeney January  Mail order or Local pharmacy name: [facility]

## 2022-03-29 RX ORDER — ESCITALOPRAM OXALATE 20 MG/1
10 TABLET ORAL DAILY
Qty: 30 TABLET | Refills: 1 | OUTPATIENT
Start: 2022-03-29

## 2022-03-30 DIAGNOSIS — Z13.6 SCREENING FOR CARDIOVASCULAR CONDITION: ICD-10-CM

## 2022-03-31 ENCOUNTER — VIRTUAL VISIT (OUTPATIENT)
Dept: FAMILY MEDICINE CLINIC | Age: 27
End: 2022-03-31
Payer: COMMERCIAL

## 2022-03-31 DIAGNOSIS — F41.1 GAD (GENERALIZED ANXIETY DISORDER): ICD-10-CM

## 2022-03-31 DIAGNOSIS — F32.1 CURRENT MODERATE EPISODE OF MAJOR DEPRESSIVE DISORDER, UNSPECIFIED WHETHER RECURRENT (HCC): Primary | ICD-10-CM

## 2022-03-31 PROCEDURE — 99213 OFFICE O/P EST LOW 20 MIN: CPT | Performed by: FAMILY MEDICINE

## 2022-03-31 RX ORDER — LEVOCETIRIZINE DIHYDROCHLORIDE 5 MG/1
TABLET, FILM COATED ORAL
COMMUNITY
Start: 2022-02-16

## 2022-03-31 RX ORDER — EPINEPHRINE 0.3 MG/.3ML
INJECTION SUBCUTANEOUS
COMMUNITY
Start: 2022-02-21

## 2022-03-31 RX ORDER — ACETAMINOPHEN 500 MG
TABLET ORAL
COMMUNITY

## 2022-03-31 RX ORDER — HYDROXYZINE 25 MG/1
25 TABLET, FILM COATED ORAL
Qty: 60 TABLET | Refills: 1 | Status: SHIPPED | OUTPATIENT
Start: 2022-03-31 | End: 2022-04-20

## 2022-03-31 RX ORDER — ESCITALOPRAM OXALATE 20 MG/1
20 TABLET ORAL DAILY
Qty: 30 TABLET | Refills: 1 | Status: SHIPPED | OUTPATIENT
Start: 2022-03-31 | End: 2022-04-30

## 2022-03-31 RX ORDER — ESCITALOPRAM OXALATE 10 MG/1
10 TABLET ORAL DAILY
COMMUNITY
End: 2022-03-31

## 2022-03-31 NOTE — PROGRESS NOTES
1. \"Have you been to the ER, urgent care clinic since your last visit? Hospitalized since your last visit? \" No    2. \"Have you seen or consulted any other health care providers outside of the [address] [place] [address] since your last visit? \" No     3. For patients aged 39-70: Has the patient had a colonoscopy / FIT/ Cologuard? NA - based on age      If the patient is female:    4. For patients aged 41-77: Has the patient had a mammogram within the past 2 years? NA - based on age or sex      11. For patients aged 21-65: Has the patient had a pap smear?  Yes - no Care Gap present

## 2022-04-01 NOTE — PROGRESS NOTES
Braxton Sánchez (: 1995) is a 31 y.o. female, established patient, here for evaluation of the following chief complaint(s):   Depression       ASSESSMENT/PLAN:  Below is the assessment and plan developed based on review of pertinent history, labs, studies, and medications. 1. Current moderate episode of major depressive disorder, unspecified whether recurrent (HCC)  -     escitalopram oxalate (LEXAPRO) 20 mg tablet; Take 1 Tablet by mouth daily for 30 days. , Normal, Disp-30 Tablet, R-1  2. YESENIA (generalized anxiety disorder)  -     hydrOXYzine HCL (ATARAX) 25 mg tablet; Take 1 Tablet by mouth three (3) times daily as needed for Anxiety for up to 20 days. , Normal, Disp-60 Tablet, R-1      Return in about 6 weeks (around 2022), or if symptoms worsen or fail to improve, for follow up anxiety and depression . SUBJECTIVE/OBJECTIVE:  HPI     Generalized anxiety disorder/major depressive disorder  YESENIA-7 score of 13--> 14, PHQ-9 score of 9--> 12. She states that she sees a therapist.  Patient has tried Lexapro in the past and has revisited it after she noticed that the buspar did not really help her and in fact caused her to miss a cycle (per patient). Patient currently denies any suicidal homicidal ideation. Denies any previous attempts. States that she has a good support system in her family and friends. She was mailed list of psychiatrists at the last visit. We will continue lexapro 20 mg daily and started hydroxyzine as needed for anxiety. Counseled patient on risks and benefits of the medication and side effects. Patient understands and is in agreement with the plan. She understands that hydroxyzine can make her drowsy and that it takes lexapro at least 4-6 weeks to kick in. Patient will be mailed a list of primary care providers in the area who are excepting new patients which she should establish care with in a couple months as I am leaving the area.     Review of Systems   Constitutional: Negative for activity change and fever. HENT: Positive for congestion and rhinorrhea. Eyes: Negative for photophobia and visual disturbance. Respiratory: Negative for cough, chest tightness and shortness of breath. Cardiovascular: Negative for chest pain, palpitations and leg swelling. Gastrointestinal: Negative for abdominal distention and abdominal pain. Genitourinary: Negative for flank pain and vaginal discharge. Musculoskeletal: Negative for arthralgias and back pain. Skin: Negative for color change and pallor. Neurological: Negative for dizziness and headaches. Psychiatric/Behavioral: Positive for decreased concentration and dysphoric mood. Negative for suicidal ideas. The patient is nervous/anxious.          Patient-Reported Weight: 181  Patient-Reported LMP: 2-       Physical Exam    [INSTRUCTIONS:  \"[x]\" Indicates a positive item  \"[]\" Indicates a negative item  -- DELETE ALL ITEMS NOT EXAMINED]    Constitutional: [x] Appears well-developed and well-nourished [x] No apparent distress      [] Abnormal -     Mental status: [x] Alert and awake  [x] Oriented to person/place/time [x] Able to follow commands    [] Abnormal -     Eyes:   EOM    [x]  Normal    [] Abnormal -   Sclera  [x]  Normal    [] Abnormal -          Discharge [x]  None visible   [] Abnormal -     HENT: [x] Normocephalic, atraumatic  [] Abnormal -   [x] Mouth/Throat: Mucous membranes are moist    External Ears [x] Normal  [] Abnormal -    Neck: [x] No visualized mass [] Abnormal -     Pulmonary/Chest: [x] Respiratory effort normal   [x] No visualized signs of difficulty breathing or respiratory distress        [] Abnormal -      Musculoskeletal:   [x] Normal gait with no signs of ataxia         [x] Normal range of motion of neck        [] Abnormal -     Neurological:        [x] No Facial Asymmetry (Cranial nerve 7 motor function) (limited exam due to video visit)          [x] No gaze palsy        [] Abnormal - Skin:        [x] No significant exanthematous lesions or discoloration noted on facial skin         [] Abnormal -            Psychiatric:       [x] Normal Affect [] Abnormal -        [x] No Hallucinations    Other pertinent observable physical exam findings:-        On this date 03/31/2022 I have spent 30 minutes reviewing previous notes, test results and face to face (virtual) with the patient discussing the diagnosis and importance of compliance with the treatment plan as well as documenting on the day of the visit. Rachel Lewis, was evaluated through a synchronous (real-time) audio-video encounter. The patient (or guardian if applicable) is aware that this is a billable service, which includes applicable co-pays. Verbal consent to proceed has been obtained. The visit was conducted pursuant to the emergency declaration under the [address] [place] [address], [address] [place] [address] authority and the [employer] General Act. Patient identification was verified, and a caregiver was present when appropriate. The patient was located at home in a state where the provider was licensed to provide care. An electronic signature was used to authenticate this note.   -- Suzi Ramos MD

## 2022-04-25 ENCOUNTER — VIRTUAL VISIT (OUTPATIENT)
Dept: FAMILY MEDICINE CLINIC | Age: 27
End: 2022-04-25
Payer: COMMERCIAL

## 2022-04-25 DIAGNOSIS — G47.00 INSOMNIA, UNSPECIFIED TYPE: ICD-10-CM

## 2022-04-25 DIAGNOSIS — F32.1 CURRENT MODERATE EPISODE OF MAJOR DEPRESSIVE DISORDER, UNSPECIFIED WHETHER RECURRENT (HCC): ICD-10-CM

## 2022-04-25 DIAGNOSIS — F41.1 GAD (GENERALIZED ANXIETY DISORDER): ICD-10-CM

## 2022-04-25 PROCEDURE — 99213 OFFICE O/P EST LOW 20 MIN: CPT | Performed by: FAMILY MEDICINE

## 2022-04-25 RX ORDER — ESZOPICLONE 1 MG/1
1 TABLET, FILM COATED ORAL
Qty: 30 TABLET | Refills: 1 | Status: SHIPPED | OUTPATIENT
Start: 2022-04-25 | End: 2022-05-25

## 2022-04-26 NOTE — PROGRESS NOTES
Gary Evans (: 1995) is a 31 y.o. female, established patient, here for evaluation of the following chief complaint(s):   Depression and Anxiety       ASSESSMENT/PLAN:  Below is the assessment and plan developed based on review of pertinent history, labs, studies, and medications. 1. Current moderate episode of major depressive disorder, unspecified whether recurrent (HCC)/2. YESENIA (generalized anxiety disorder)  -We will mail out a list of psychiatrists in the area to establish care with  - Have given her numbers at this visit today to call to establish care with them as soon as possible  -continue lexapro, stop hydroxyzine and start lunesta instead for sleep. Counseled the patient should not stay on Lunesta for a long period of time. Though she cannot be addicted to this medication she can become dependent on it for sleep. -[facility] paperwork filled out today for continuous leave from 2022 to May 23, 2022.  3. Insomnia, unspecified type  -     eszopiclone (LUNESTA) 1 mg tablet; Take 1 Tablet by mouth nightly as needed for Sleep for up to 30 days. Max Daily Amount: 1 mg., Normal, Disp-30 Tablet, R-1      Return in about 6 weeks (around 2022), or if symptoms worsen or fail to improve, for follow up depression and anxiety/sleep. SUBJECTIVE/OBJECTIVE:  HPI     MDD/Generalized anxiety disorder/insomnia  YESENIA-7 score of 13--> 15, PHQ-9 score of 9--> 21. She states that she sees a therapist who told her that maybe she needs something as needed for her anxiety. Patient has tried Lexapro in the past but this has not worked for her. She states that she tried it for 3 months. Patient currently denies any suicidal homicidal ideation. Denies any previous attempts. States she has passive thoughts of not waking up. States that she has a good support system in her family and friends.   Patient is willing to try buspirone and advised her to seek psychiatric help should she want other medication for anxiety. Patient stated buspirone did not help her so stopped it and switch to hydroxyzine 25 mg especially to help with sleep at time. Patient states that she was drinking a lot of alcohol along with the hydroxyzine and that helped her sleep. Just hydroxyzine is not helping anymore. Patient is continuing with Lexapro 20 mg daily. She will was given numbers to psychiatrist today. I will also mail out a list of other psychiatrist in the area that she can try to establish care with a soon as possible. Patient states that she has been having a really hard time sleeping and would like something to help her sleep. We will try Lunesta counseled about the side effects of extreme drowsiness and she should not mix this with alcohol or hydroxyzine and should not take more than 1 at night. Patient states that she understands. PDMP reviewed and consistent with medical drug administration record, prescriber, patient history. Patient states that she has a family history of addiction she does not want to be addicted to any medications. Patient also states that she is dealing with a lot right now and cannot seem to function properly with regards to work or school or anything else. 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 She states that she needs a break from work and would like to take the next 4 weeks off until May 23, 2022. [facility] paperwork was filled out today and will be faxed to insurance company after patient signs at tomorrow morning. Patient states that at the rate she is going right now she is realizing that she is very overwhelmed and unable to handle everything on her plate.   She has had panic attacks at least 2-3 times a week. Review of Systems   Constitutional: Negative for activity change, chills and fatigue. HENT: Negative for congestion, facial swelling, postnasal drip and rhinorrhea. Eyes: Negative for photophobia and redness. Respiratory: Negative for apnea, cough and shortness of breath. Cardiovascular: Negative for chest pain, palpitations and leg swelling. Gastrointestinal: Negative for abdominal distention, abdominal pain, constipation, diarrhea and nausea. Genitourinary: Negative for frequency and urgency. Musculoskeletal: Negative for back pain and gait problem. Skin: Negative for rash. Allergic/Immunologic: Negative for environmental allergies. Neurological: Negative for dizziness, light-headedness and headaches. Psychiatric/Behavioral: Positive for decreased concentration, dysphoric mood and sleep disturbance. Negative for agitation, confusion, self-injury and suicidal ideas. The patient is nervous/anxious.          No data recorded     Physical Exam    [INSTRUCTIONS:  \"[x]\" Indicates a positive item  \"[]\" Indicates a negative item  -- DELETE ALL ITEMS NOT EXAMINED]    Constitutional: [x] Appears well-developed and well-nourished [x] No apparent distress      [] Abnormal -     Mental status: [x] Alert and awake  [x] Oriented to person/place/time [x] Able to follow commands    [] Abnormal -     Eyes:   EOM    [x]  Normal    [] Abnormal -   Sclera  [x]  Normal    [] Abnormal -          Discharge [x]  None visible   [] Abnormal -     HENT: [x] Normocephalic, atraumatic  [] Abnormal -   [x] Mouth/Throat: Mucous membranes are moist    External Ears [x] Normal  [] Abnormal -    Neck: [x] No visualized mass [] Abnormal -     Pulmonary/Chest: [x] Respiratory effort normal   [x] No visualized signs of difficulty breathing or respiratory distress        [] Abnormal -      Musculoskeletal:   [x] Normal gait with no signs of ataxia         [x] Normal range of motion of neck        [] Abnormal - Neurological:        [x] No Facial Asymmetry (Cranial nerve 7 motor function) (limited exam due to video visit)          [x] No gaze palsy        [] Abnormal -          Skin:        [x] No significant exanthematous lesions or discoloration noted on facial skin         [] Abnormal -            Psychiatric:       [x] Normal Affect [] Abnormal -        [x] No Hallucinations    Other pertinent observable physical exam findings:-        On this date 04/25/2022 I have spent 30 minutes reviewing previous notes, test results and face to face (virtual) with the patient discussing the diagnosis and importance of compliance with the treatment plan as well as documenting on the day of the visit. Kaiden Neil, was evaluated through a synchronous (real-time) audio-video encounter. The patient (or guardian if applicable) is aware that this is a billable service, which includes applicable co-pays. Verbal consent to proceed has been obtained. The visit was conducted pursuant to the emergency declaration under the [address] [place] [address], [address] [place] [address] authority and the [email or web address] General Act. Patient identification was verified, and a caregiver was present when appropriate. The patient was located at home in a state where the provider was licensed to provide care. An electronic signature was used to authenticate this note.   -- Johny Burt MD

## 2022-05-02 ENCOUNTER — TELEPHONE (OUTPATIENT)
Dept: FAMILY MEDICINE CLINIC | Age: 27
End: 2022-05-02

## 2022-05-02 DIAGNOSIS — K92.0 HEMATEMESIS WITH NAUSEA: Primary | ICD-10-CM

## 2022-05-02 NOTE — TELEPHONE ENCOUNTER
Pt stated she would like a referral  for gastrology because she was vomitting and she saw specs of blood in her vomit. Please advise.  Thank you!!

## 2022-05-03 NOTE — TELEPHONE ENCOUNTER
Spoke to patient who stated that she had bloody vomitus on Sunday along with throat pain. She states that earlier that day she had 2 shots which she thinks might have triggered the vomiting. She had multiple episodes of vomiting but only 1 bloody vomitus. Denies any abdominal pain, blood in stools, weight loss, difficulty eating, current vomiting. States that she is nauseous but is able to keep food and water down. I will refer patient to the GI doctor at this time. She was advised to go to the ER should she have any more episodes of bloody vomitus.

## 2022-05-11 ENCOUNTER — OFFICE VISIT (OUTPATIENT)
Dept: FAMILY MEDICINE CLINIC | Age: 27
End: 2022-05-11
Payer: COMMERCIAL

## 2022-05-11 VITALS
WEIGHT: 178 LBS | BODY MASS INDEX: 31.54 KG/M2 | RESPIRATION RATE: 16 BRPM | OXYGEN SATURATION: 97 % | TEMPERATURE: 97.8 F | DIASTOLIC BLOOD PRESSURE: 88 MMHG | HEIGHT: 63 IN | HEART RATE: 84 BPM | SYSTOLIC BLOOD PRESSURE: 124 MMHG

## 2022-05-11 DIAGNOSIS — F32.1 CURRENT MODERATE EPISODE OF MAJOR DEPRESSIVE DISORDER, UNSPECIFIED WHETHER RECURRENT (HCC): ICD-10-CM

## 2022-05-11 DIAGNOSIS — F41.1 GAD (GENERALIZED ANXIETY DISORDER): ICD-10-CM

## 2022-05-11 DIAGNOSIS — F41.9 ANXIETY: ICD-10-CM

## 2022-05-11 DIAGNOSIS — G47.00 INSOMNIA, UNSPECIFIED TYPE: ICD-10-CM

## 2022-05-11 PROCEDURE — 99213 OFFICE O/P EST LOW 20 MIN: CPT | Performed by: FAMILY MEDICINE

## 2022-05-11 RX ORDER — ESCITALOPRAM OXALATE 20 MG/1
20 TABLET ORAL DAILY
COMMUNITY
Start: 2022-04-30 | End: 2022-06-13

## 2022-05-11 NOTE — PROGRESS NOTES
1. \"Have you been to the ER, urgent care clinic since your last visit? Hospitalized since your last visit? \" [facility]  [facility]    2. \"Have you seen or consulted any other health care providers outside of the [address] [place] [address] since your last visit? \" No     3. For patients aged 39-70: Has the patient had a colonoscopy / FIT/ Cologuard? NA - based on age      If the patient is female:    4. For patients aged 41-77: Has the patient had a mammogram within the past 2 years? NA - based on age or sex      11. For patients aged 21-65: Has the patient had a pap smear?  No

## 2022-05-11 NOTE — PATIENT INSTRUCTIONS
Recovering From Depression: Care Instructions  Your Care Instructions     Taking good care of yourself is important as you recover from depression. In time, your symptoms will fade as your treatment takes hold. Do not give up. Instead, focus your energy on getting better. Your mood will improve. It just takes some time. Focus on things that can help you feel better, such as being with friends and family, eating well, and getting enough rest. But take things slowly. Do not do too much too soon. You will begin to feel better gradually. Follow-up care is a key part of your treatment and safety. Be sure to make and go to all appointments, and call your doctor if you are having problems. It's also a good idea to know your test results and keep a list of the medicines you take. How can you care for yourself at home? Be realistic  · If you have a large task to do, break it up into smaller steps you can handle, and just do what you can. · You may want to put off important decisions until your depression has lifted. If you have plans that will have a major impact on your life, such as marriage, divorce, or a job change, try to wait a bit. Talk it over with friends and loved ones who can help you look at the overall picture first.  · Reaching out to people for help is important. Do not isolate yourself. Let your family and friends help you. Find someone you can trust and confide in, and talk to that person. · Be patient, and be kind to yourself. Remember that depression is not your fault and is not something you can overcome with willpower alone. Treatment is important for depression, just like for any other illness. Feeling better takes time, and your mood will improve little by little. Stay active  · Stay busy and get outside. Take a walk, or try some other light exercise. · Talk with your doctor about an exercise program. Exercise can help with mild depression. · Go to a movie or concert.  Take part in a Ca[employer] activity or other social gathering. Go to a [employer] game. · Ask a friend to have dinner with you. Take care of yourself  · Eat a balanced diet with plenty of fresh fruits and vegetables, whole grains, and lean protein. If you have lost your appetite, eat small snacks rather than large meals. · Avoid using illegal drugs or marijuana and drinking alcohol. Do not take medicines that have not been prescribed for you. They may interfere with medicines you may be taking for depression, or they may make your depression worse. · Take your medicines exactly as they are prescribed. You may start to feel better within 1 to 3 weeks of taking antidepressant medicine. But it can take as many as 6 to 8 weeks to see more improvement. If you have questions or concerns about your medicines, or if you do not notice any improvement by 3 weeks, talk to your doctor. · Continue to take your medicine after your symptoms improve. Taking your medicine for at least 6 months after you feel better can help keep you from getting depressed again. If this isn't the first time you have been depressed, your doctor may recommend you to take medicine even longer. · If you have any side effects from your medicine, tell your doctor. Many side effects are mild and will go away on their own after you have been taking the medicine for a few weeks. Some may last longer. Talk to your doctor if side effects are bothering you too much. You might be able to try a different medicine. · Continue counseling. It may help prevent depression from returning, especially if you've had multiple episodes of depression. Talk with your counselor if you are having a hard time attending your sessions or you think the sessions aren't working. Don't just stop going. · Get enough sleep. Talk to your doctor if you are having problems sleeping. · Avoid sleeping pills unless they are prescribed by the doctor treating your depression.  Sleeping pills may make you groggy during the day, and they may interact with other medicine you are taking. · If you have any other illnesses, such as diabetes, heart disease, or high blood pressure, make sure to continue with your treatment. Tell your doctor about all of the medicines you take, including those with or without a prescription. · If you or someone you know talks about suicide, self-harm, or feeling hopeless, get help right away. Call the [address] [place] [address] at 1-800-273-talk (0-586.907.2854) or text HOME to 301362 to access the Crisis Text Line. Consider saving these numbers in your phone. When should you call for help? Call 911 anytime you think you may need emergency care. For example, call if:    · You feel like hurting yourself or someone else.     · Someone you know has depression and is about to attempt or is attempting suicide. Call your doctor now or seek immediate medical care if:    · You hear voices.     · Someone you know has depression and:  ? Starts to give away his or her possessions. ? Uses illegal drugs or drinks alcohol heavily. ? Talks or writes about death, including writing suicide notes or talking about guns, knives, or pills. ? Starts to spend a lot of time alone. ? Acts very aggressively or suddenly appears calm. Watch closely for changes in your health, and be sure to contact your doctor if:    · You do not get better as expected. Where can you learn more? Go to http://www.gray.com/  Enter N529 in the search box to learn more about \"Recovering From Depression: Care Instructions. \"  Current as of: June 16, 2021               Content Version: 13.2  © 1212-7523 [employer]. Care instructions adapted under license by [employer] (which disclaims liability or warranty for this information).  If you have questions about a medical condition or this instruction, always ask your healthcare professional. [address] any warranty or liability for your use of this information.
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
## 2022-05-17 ENCOUNTER — TELEPHONE (OUTPATIENT)
Dept: FAMILY MEDICINE CLINIC | Age: 27
End: 2022-05-17

## 2022-05-17 NOTE — TELEPHONE ENCOUNTER
Pt stated she had [facility] paperwork filled out but Dr. Yusra Quinones missed a signature. Pt stated she only has 5 days to turn the paperwork in or she can lose her job. Pt would like to speak to someone as soon as possible. MsTrenton Aria Aaron can be reached at [phone]. Please advise.  Thank you!!!

## 2022-05-18 NOTE — TELEPHONE ENCOUNTER
Pt calling again to check on the status of her [facility] paperwork, Dr. Guzman Shown missed a signature. Pt stated she will lose her job if it is not signed in 4 days. She would like to speak to someone as soon as possible. Ms. Jimmie Mendoza can be reached at [phone]. Please advise.  Thank you!!!

## 2022-05-19 NOTE — TELEPHONE ENCOUNTER
Paperwork was received and Dr Xin De León signed for Dr Augusta Massey (signatures) faxed and debby was notified

## 2022-05-23 ENCOUNTER — TELEPHONE (OUTPATIENT)
Dept: FAMILY MEDICINE CLINIC | Age: 27
End: 2022-05-23

## 2022-05-23 NOTE — TELEPHONE ENCOUNTER
Pt called and stated she was concerned about going back to work until she saw a psychiatrist. She would like an extension. MsTrenton Jeanne Bourne would like to speak to someone as soon as possible. She can be reached at [phone]. Please advise.  Thank you!!!

## 2022-05-25 ENCOUNTER — OFFICE VISIT (OUTPATIENT)
Dept: FAMILY MEDICINE CLINIC | Age: 27
End: 2022-05-25
Payer: COMMERCIAL

## 2022-05-25 VITALS
HEART RATE: 90 BPM | DIASTOLIC BLOOD PRESSURE: 87 MMHG | TEMPERATURE: 97.6 F | RESPIRATION RATE: 20 BRPM | SYSTOLIC BLOOD PRESSURE: 122 MMHG | HEIGHT: 63 IN | OXYGEN SATURATION: 100 % | BODY MASS INDEX: 31.01 KG/M2 | WEIGHT: 175 LBS

## 2022-05-25 DIAGNOSIS — F39 MOOD DISORDER (HCC): Primary | ICD-10-CM

## 2022-05-25 PROCEDURE — 99213 OFFICE O/P EST LOW 20 MIN: CPT | Performed by: FAMILY MEDICINE

## 2022-05-25 NOTE — PROGRESS NOTES
Chief Complaint   Patient presents with    Follow Up Chronic Condition     [facility] Paperwork     1. \"Have you been to the ER, urgent care clinic since your last visit? Hospitalized since your last visit? \" No    2. \"Have you seen or consulted any other health care providers outside of the [address] [place] [address] since your last visit? \" No     3. For patients aged 39-70: Has the patient had a colonoscopy / FIT/ Cologuard? NA - based on age      If the patient is female:    4. For patients aged 41-77: Has the patient had a mammogram within the past 2 years? NA - based on age or sex      11. For patients aged 21-65: Has the patient had a pap smear?  No

## 2022-05-25 NOTE — PROGRESS NOTES
CRISTIAN Evans comes in for follow-up care. Depression: Patient has depression. She has been followed up by her PCP. She is on medication management. She is on Lexapro. She is being followed up by behavioral health specialist.  She has [facility] paperwork as she has not been able to get back to work given the depression. She would like this extended until 20 June 2022. The [facility] paperwork was filled out. Insomnia: Patient has insomnia. She takes [place]. She will continue with the current treatment plan. GERD: Patient has gastroesophageal reflux disease. She is on Prilosec. Denies hematemesis or dark stools. Past Medical History  Past Medical History:   Diagnosis Date    COVID-19     Depression     GERD (gastroesophageal reflux disease)        Surgical History  Past Surgical History:   Procedure Laterality Date    HX ORTHOPAEDIC          Medications  Current Outpatient Medications   Medication Sig Dispense Refill    escitalopram oxalate (LEXAPRO) 20 mg tablet Take 20 mg by mouth daily.  eszopiclone (LUNESTA) 1 mg tablet Take 1 Tablet by mouth nightly as needed for Sleep for up to 30 days. Max Daily Amount: 1 mg. 30 Tablet 1    levocetirizine (XYZAL) 5 mg tablet 1 TAB ORALLY ONCE A DAY 30 DAY(S)      EPINEPHrine (EPIPEN) 0.3 mg/0.3 mL injection AS DIRECTED INJECTION AS NEEDED FOR 1 DAYS      acetaminophen (TYLENOL) 500 mg tablet acetaminophen 500 mg tablet   TK 1 T PO Q 4 H PRN      omeprazole (PRILOSEC) 40 mg capsule Take 1 Capsule by mouth daily. 90 Capsule 1    predniSONE (DELTASONE) 20 mg tablet Take 3 tablets daily by mouth for 3 days, then take 2 tablets daily by mouth for 3 days, then take 1 tablet daily by mouth for 3 days.  (Patient not taking: Reported on 1/7/2022) 18 Tablet 0       Allergies  Allergies   Allergen Reactions    Amoxicillin Itching       Family History  Family History   Problem Relation Age of Onset    Hypertension Mother     Cancer Maternal Grandfather Social History  Social History     Socioeconomic History    Marital status: SINGLE     Spouse name: Not on file    Number of children: Not on file    Years of education: Not on file    Highest education level: Not on file   Occupational History    Not on file   Tobacco Use    Smoking status: Never Smoker    Smokeless tobacco: Never Used   Vaping Use    Vaping Use: Never used   Substance and Sexual Activity    Alcohol use: Yes     Comment: occasionally    Drug use: Never    Sexual activity: Yes     Birth control/protection: None   Other Topics Concern    Not on file   Social History Narrative    Not on file     Social Determinants of Health     Financial Resource Strain:     Difficulty of Paying Living Expenses: Not on file   Food Insecurity:     Worried About Running Out of Food in the Last Year: Not on file    Sudha of Food in the Last Year: Not on file   Transportation Needs:     Lack of Transportation (Medical): Not on file    Lack of Transportation (Non-Medical):  Not on file   Physical Activity:     Days of Exercise per Week: Not on file    Minutes of Exercise per Session: Not on file   Stress:     Feeling of Stress : Not on file   Social Connections:     Frequency of Communication with Friends and Family: Not on file    Frequency of Social Gatherings with Friends and Family: Not on file    Attends Yazidism Services: Not on file    Active Member of [address] [place] [address] or Organizations: Not on file    Attends Club or Organization Meetings: Not on file    Marital Status: Not on file   Intimate Partner Violence:     Fear of Current or Ex-Partner: Not on file    Emotionally Abused: Not on file    Physically Abused: Not on file    Sexually Abused: Not on file   Housing Stability:     Unable to Pay for Housing in the Last Year: Not on file    Number of [place] in the Last Year: Not on file    Unstable Housing in the Last Year: Not on file       Review of Systems  Review of Systems - Review of all systems is negative except as noted above in the HPI. Vital Signs  Visit Vitals  /87 (BP 1 Location: Left upper arm, BP Patient Position: Sitting, BP Cuff Size: Adult)   Pulse 90   Temp 97.6 °F (36.4 °C) (Temporal)   Resp 20   Ht 5' 3\" (1.6 m)   Wt 175 lb (79.4 kg)   LMP 04/27/2022 (Exact Date)   SpO2 100%   BMI 31.00 kg/m²         Physical Exam  Physical Examination: General appearance - oriented to person, place, and time and acyanotic, in no respiratory distress  Mental status - affect appropriate to mood  Chest - clear to auscultation, no wheezes, rales or rhonchi, symmetric air entry  Heart - S1 and S2 normal  Abdomen - no rebound tenderness noted  Neurological - motor and sensory grossly normal bilaterally  Musculoskeletal - no muscular tenderness noted      Results  Results for orders placed or performed in visit on 02/02/22   LIPID PANEL   Result Value Ref Range    Triglyceride 88 40 - 149 mg/dL    HDL Cholesterol 50 >=40 mg/dL    Cholesterol, total 176 110 - 200 mg/dL    CHOLESTEROL/HDL 3.5 0.0 - 5.0    Non-HDL Cholesterol 126 <130 mg/dL    LDL, calculated 108 (H) 50 - 99 mg/dL    VLDL, calculated 18 8 - 30 mg/dL    LDL/HDL Ratio 2.2    METABOLIC PANEL, COMPREHENSIVE   Result Value Ref Range    Glucose 74 70 - 99 mg/dL    BUN 8 6 - 22 mg/dL    Creatinine 0.9 0.5 - 1.2 mg/dL    Sodium 140 133 - 145 mmol/L    Potassium 4.0 3.5 - 5.5 mmol/L    Chloride 103 98 - 110 mmol/L    CO2 26 20 - 32 mmol/L    AST (SGOT) 18 10 - 37 U/L    ALT (SGPT) 16 5 - 40 U/L    Alk.  phosphatase 76 25 - 115 U/L    Bilirubin, total 0.9 0.2 - 1.2 mg/dL    Calcium 9.5 8.4 - 10.5 mg/dL    Protein, total 7.3 6.4 - 8.3 g/dL    Albumin 4.6 3.5 - 5.0 g/dL    A-G Ratio 1.7 1.1 - 2.6 ratio    Globulin 2.7 2.0 - 4.0 g/dL    Anion gap 11.0 3.0 - 15.0 mmol/L    GFRAA >60.0 >60.0    GFRNA >60.0 >60.0   CBC W/O DIFF   Result Value Ref Range    WBC 5.9 4.0 - 11.0 K/uL    RBC 4.16 3.80 - 5.20 M/uL    HGB 13.1 11.7 - 15.5 g/dL HCT 40.7 35.1 - 46.5 %    MCV 98 80 - 99 fL    MCH 32 26 - 34 pg    MCHC 32 31 - 36 g/dL    RDW 11.9 10.0 - 15.5 %    PLATELET 571 162 - 428 K/uL    MPV 10.4 9.0 - 13.0 fL       ASSESSMENT and PLAN    ICD-10-CM ICD-9-CM    1. [facility]  F39 296.90      reviewed diet, exercise and weight control  reviewed medications and side effects in detail      I have discussed the diagnosis with the patient and the intended plan of care as seen in the above orders. The patient has received an after-visit summary and questions were answered concerning future plans. I have discussed medication, side effects, and warnings with the patient in detail. The patient verbalized understanding and is in agreement with the plan of care. The patient will contact the office with any additional concerns. Sophia Lin MD    PLEASE NOTE:   This document has been produced using voice recognition software.  Unrecognized errors in transcription may be present

## 2022-06-07 ENCOUNTER — TELEPHONE (OUTPATIENT)
Dept: FAMILY MEDICINE CLINIC | Age: 27
End: 2022-06-07

## 2022-06-07 NOTE — TELEPHONE ENCOUNTER
Spoke with patient and advised her that we would fax the office notes from her visit on 5- to the short term disablilty company at patient's request

## 2022-06-07 NOTE — TELEPHONE ENCOUNTER
Pt calling because the wrong Short term disability paperwork was faxed over. Pt stated the correct one is from June. Pt isn't able to get her money until the correct paperwork is faxed over. [employer] Inocencia Rodriguez would like to speak to Sarah Malik as soon as possible. She can be reached at [phone]. Please advise.  Thank you!!!